# Patient Record
Sex: FEMALE | Race: WHITE | Employment: OTHER | URBAN - METROPOLITAN AREA
[De-identification: names, ages, dates, MRNs, and addresses within clinical notes are randomized per-mention and may not be internally consistent; named-entity substitution may affect disease eponyms.]

---

## 2017-11-01 ENCOUNTER — OFFICE VISIT (OUTPATIENT)
Dept: FAMILY MEDICINE CLINIC | Age: 67
End: 2017-11-01

## 2017-11-01 VITALS
DIASTOLIC BLOOD PRESSURE: 88 MMHG | BODY MASS INDEX: 26.68 KG/M2 | OXYGEN SATURATION: 95 % | TEMPERATURE: 98.9 F | HEIGHT: 62 IN | WEIGHT: 145 LBS | SYSTOLIC BLOOD PRESSURE: 140 MMHG | HEART RATE: 76 BPM

## 2017-11-01 DIAGNOSIS — M19.90 ARTHRITIS: ICD-10-CM

## 2017-11-01 DIAGNOSIS — F41.9 ANXIETY: ICD-10-CM

## 2017-11-01 DIAGNOSIS — E03.9 HYPOTHYROIDISM, UNSPECIFIED TYPE: ICD-10-CM

## 2017-11-01 DIAGNOSIS — E11.9 TYPE 2 DIABETES MELLITUS WITHOUT COMPLICATION, WITH LONG-TERM CURRENT USE OF INSULIN (HCC): Primary | ICD-10-CM

## 2017-11-01 DIAGNOSIS — I10 ESSENTIAL HYPERTENSION: ICD-10-CM

## 2017-11-01 DIAGNOSIS — Z79.4 TYPE 2 DIABETES MELLITUS WITHOUT COMPLICATION, WITH LONG-TERM CURRENT USE OF INSULIN (HCC): Primary | ICD-10-CM

## 2017-11-01 DIAGNOSIS — E03.8 OTHER SPECIFIED HYPOTHYROIDISM: ICD-10-CM

## 2017-11-01 LAB
A/G RATIO: 1.1 (ref 1.1–2.2)
ALBUMIN SERPL-MCNC: 3.1 G/DL (ref 3.4–5)
ALP BLD-CCNC: 81 U/L (ref 40–129)
ALT SERPL-CCNC: 8 U/L (ref 10–40)
ANION GAP SERPL CALCULATED.3IONS-SCNC: 13 MMOL/L (ref 3–16)
AST SERPL-CCNC: 12 U/L (ref 15–37)
BILIRUB SERPL-MCNC: <0.2 MG/DL (ref 0–1)
BUN BLDV-MCNC: 15 MG/DL (ref 7–20)
CALCIUM SERPL-MCNC: 9.3 MG/DL (ref 8.3–10.6)
CHLORIDE BLD-SCNC: 99 MMOL/L (ref 99–110)
CO2: 29 MMOL/L (ref 21–32)
CREAT SERPL-MCNC: 1.4 MG/DL (ref 0.6–1.2)
GFR AFRICAN AMERICAN: 45
GFR NON-AFRICAN AMERICAN: 38
GLOBULIN: 2.9 G/DL
GLUCOSE BLD-MCNC: 169 MG/DL (ref 70–99)
HCT VFR BLD CALC: 44.4 % (ref 36–48)
HEMOGLOBIN: 14.8 G/DL (ref 12–16)
MCH RBC QN AUTO: 29.2 PG (ref 26–34)
MCHC RBC AUTO-ENTMCNC: 33.4 G/DL (ref 31–36)
MCV RBC AUTO: 87.3 FL (ref 80–100)
PDW BLD-RTO: 14.6 % (ref 12.4–15.4)
PLATELET # BLD: 252 K/UL (ref 135–450)
PMV BLD AUTO: 8.5 FL (ref 5–10.5)
POTASSIUM SERPL-SCNC: 4.6 MMOL/L (ref 3.5–5.1)
RBC # BLD: 5.08 M/UL (ref 4–5.2)
SODIUM BLD-SCNC: 141 MMOL/L (ref 136–145)
TOTAL PROTEIN: 6 G/DL (ref 6.4–8.2)
WBC # BLD: 7.7 K/UL (ref 4–11)

## 2017-11-01 PROCEDURE — 1123F ACP DISCUSS/DSCN MKR DOCD: CPT | Performed by: NURSE PRACTITIONER

## 2017-11-01 PROCEDURE — 36415 COLL VENOUS BLD VENIPUNCTURE: CPT | Performed by: NURSE PRACTITIONER

## 2017-11-01 PROCEDURE — G8419 CALC BMI OUT NRM PARAM NOF/U: HCPCS | Performed by: NURSE PRACTITIONER

## 2017-11-01 PROCEDURE — G8484 FLU IMMUNIZE NO ADMIN: HCPCS | Performed by: NURSE PRACTITIONER

## 2017-11-01 PROCEDURE — 4004F PT TOBACCO SCREEN RCVD TLK: CPT | Performed by: NURSE PRACTITIONER

## 2017-11-01 PROCEDURE — G8400 PT W/DXA NO RESULTS DOC: HCPCS | Performed by: NURSE PRACTITIONER

## 2017-11-01 PROCEDURE — 1090F PRES/ABSN URINE INCON ASSESS: CPT | Performed by: NURSE PRACTITIONER

## 2017-11-01 PROCEDURE — 3045F PR MOST RECENT HEMOGLOBIN A1C LEVEL 7.0-9.0%: CPT | Performed by: NURSE PRACTITIONER

## 2017-11-01 PROCEDURE — 3017F COLORECTAL CA SCREEN DOC REV: CPT | Performed by: NURSE PRACTITIONER

## 2017-11-01 PROCEDURE — G8598 ASA/ANTIPLAT THER USED: HCPCS | Performed by: NURSE PRACTITIONER

## 2017-11-01 PROCEDURE — 99214 OFFICE O/P EST MOD 30 MIN: CPT | Performed by: NURSE PRACTITIONER

## 2017-11-01 PROCEDURE — 3014F SCREEN MAMMO DOC REV: CPT | Performed by: NURSE PRACTITIONER

## 2017-11-01 PROCEDURE — G8427 DOCREV CUR MEDS BY ELIG CLIN: HCPCS | Performed by: NURSE PRACTITIONER

## 2017-11-01 PROCEDURE — 4040F PNEUMOC VAC/ADMIN/RCVD: CPT | Performed by: NURSE PRACTITIONER

## 2017-11-01 RX ORDER — PAROXETINE HYDROCHLORIDE 20 MG/1
TABLET, FILM COATED ORAL
Qty: 90 TABLET | Refills: 2 | Status: SHIPPED | OUTPATIENT
Start: 2017-11-01 | End: 2018-08-09 | Stop reason: SDUPTHER

## 2017-11-01 RX ORDER — CLOPIDOGREL BISULFATE 75 MG/1
75 TABLET ORAL DAILY
COMMUNITY
End: 2017-11-22 | Stop reason: SDUPTHER

## 2017-11-01 RX ORDER — HYDROCHLOROTHIAZIDE 25 MG/1
25 TABLET ORAL DAILY
COMMUNITY
End: 2018-04-04 | Stop reason: SDUPTHER

## 2017-11-01 RX ORDER — PIOGLITAZONEHYDROCHLORIDE 30 MG/1
30 TABLET ORAL DAILY
Status: ON HOLD | COMMUNITY
End: 2018-04-24 | Stop reason: CLARIF

## 2017-11-01 ASSESSMENT — ENCOUNTER SYMPTOMS
VOMITING: 0
CONSTIPATION: 0
DIARRHEA: 0
SHORTNESS OF BREATH: 0
NAUSEA: 0
BLOOD IN STOOL: 0

## 2017-11-01 NOTE — PROGRESS NOTES
Subjective:      Patient ID: Abiodun Yun is a 78 y.o. female. HPI Pt is here to establish care. Pt is having problems with sleeping she has had problems for years, pt drinks coffee all day. Pt said she has really bad arthritis which causes her to not be able to walk because she is can not go up steps. Pt has diabetes, she has been having blood sugars running 250-400. Review of Systems   Constitutional: Positive for fatigue. Respiratory: Negative for shortness of breath. Gastrointestinal: Negative for blood in stool, constipation, diarrhea, nausea and vomiting. Psychiatric/Behavioral: Positive for sleep disturbance. Negative for hallucinations. The patient is not nervous/anxious. All other systems reviewed and are negative. Objective:   Physical Exam   Constitutional: She is oriented to person, place, and time. She appears well-developed and well-nourished. Cardiovascular: Normal rate, regular rhythm and normal heart sounds. No murmur heard. Pulmonary/Chest: Effort normal and breath sounds normal. She has no wheezes. She has no rales. Neurological: She is alert and oriented to person, place, and time. Skin: Skin is warm and dry. Psychiatric: She has a normal mood and affect. Her behavior is normal. Judgment and thought content normal.   Vitals reviewed. Assessment:      1. Type 2 diabetes mellitus without complication, with long-term current use of insulin ([facility])  COMPREHENSIVE METABOLIC PANEL    HEMOGLOBIN A1C   2. Essential hypertension  CBC   3. Arthritis     4. Hypothyroidism, unspecified type  TSH with Reflex   5. Anxiety  PARoxetine (PAXIL) 20 MG tablet           Plan:      Bishop Hansen was seen today for establish care. Diagnoses and all orders for this visit:    Type 2 diabetes mellitus without complication, with long-term current use of insulin ([facility] [address] - pt Sugars seem to run high and low. Patient cannot afford her Lantus or Humalog.  Will check with care coordinator

## 2017-11-02 LAB
ESTIMATED AVERAGE GLUCOSE: 177.2 MG/DL
HBA1C MFR BLD: 7.8 %
T4 FREE: 1.2 NG/DL (ref 0.9–1.8)
TSH REFLEX: 18.36 UIU/ML (ref 0.27–4.2)

## 2017-11-03 DIAGNOSIS — E03.8 OTHER SPECIFIED HYPOTHYROIDISM: ICD-10-CM

## 2017-11-03 RX ORDER — LEVOTHYROXINE SODIUM 137 UG/1
137 TABLET ORAL DAILY
Status: CANCELLED | OUTPATIENT
Start: 2017-11-03

## 2017-11-03 RX ORDER — LEVOTHYROXINE SODIUM 137 UG/1
137 TABLET ORAL DAILY
Qty: 90 TABLET | Refills: 0 | Status: SHIPPED | OUTPATIENT
Start: 2017-11-03 | End: 2018-03-26 | Stop reason: SDUPTHER

## 2017-11-06 ENCOUNTER — CARE COORDINATION (OUTPATIENT)
Dept: CARE COORDINATION | Age: 67
End: 2017-11-06

## 2017-11-06 NOTE — CARE COORDINATION
Left message for patient to return call to this [facility] to assess needs for care coordination. Referral received from Kylie Pearce CNP, for assistance with diabetes management and assistance with insulin. Will await return call from patient.      Ayden Dougherty RN   Care Coordinator  [phone]

## 2017-11-13 ENCOUNTER — CARE COORDINATION (OUTPATIENT)
Dept: CARE COORDINATION | Age: 67
End: 2017-11-13

## 2017-11-13 NOTE — CARE COORDINATION
Left message # 2 for patient to return call to this [facility] to assess needs for care coordination. Referral received from Daisy Ramos CNP, for assistance with diabetes management and assistance with insulin. Will await return call from patient.      Bobby Xavier RN   Care Coordinator  [phone]

## 2017-11-20 ENCOUNTER — CARE COORDINATION (OUTPATIENT)
Dept: CARE COORDINATION | Age: 67
End: 2017-11-20

## 2017-11-20 NOTE — CARE COORDINATION
Left message # 3 at number listed in chart, [phone], for patient to return call to this [facility] to assess needs for care coordination. Referral received from Shannon Garcia CNP, for assistance with diabetes management and assistance with insulin, but have not been able to reach patient. Patient has appt on 11-, please give my contact information at [phone].      Future Appointments  Date Time Provider Dannie Chiang   11/22/2017 2:15 PM Rachelle Zarate CNP [facility]     Thanks,    Gini Fitzgerald, RN   Care Coordinator  [phone]

## 2017-11-21 ENCOUNTER — CARE COORDINATION (OUTPATIENT)
Dept: CARE COORDINATION | Age: 67
End: 2017-11-21

## 2017-11-21 NOTE — CARE COORDINATION
Ambulatory Care Coordination Note  11/22/2017  CM Risk Score: 2  Denny Mortality Risk Score: 3.15    ACC: Eliezer Martínez RN    Summary Note: Patient returned call, explained role of the [facility] and Care Coordination Program.  Patient has not been on insulin for at least a year due to cost, is currently on other oral medications, but would benefit from starting insulin. She states she was on Lantus in the past, but had to stop taking it due to cost.  Patient may qualify for patient assistance from the  if she meet qualifications. She is to see Ebenezer Mcghee CNP today and to bring Social Security statement or proof of monthly income AND statement from the insurance/pharmacy of what her out of pocket expenses have been for prescriptions this year. If patient does not meet these guidelines with being so close to the end of the year, the other option may be to get her samples from a drug representative. After patient brings in all required documents, will determine if she qualifies for assistance. Which insulin would you be prescribing? This is helpful to determine if she meets guidelines from the specific . Patient's daughter in law is supportive per patient. She helps check blood sugars TID and helps sometimes with meals. Patient was currently checking blood sugars sometimes before and sometimes after meals. Patient to start checking blood sugars TID before each meal.  Patient is not on any specific diet for diabetes. Educated on ADA diet recommendations of 30-45 gram consistent carbohydrate each meal.  Discussed foods that are higher in carbs and reviewed serving and portion size.       Ambulatory Care Coordination Assessment    Care Coordination Protocol  Program Enrollment:  Rising Risk  Referral from Primary Care Provider:  Yes  Week 1 - Initial Assessment     Do you have all of your prescriptions and are they filled?:  Yes  Are you able to afford your medications?:  No  How (including ability to afford all required medical care)?:  Financially secure, some resource challenges   How wells does the patient now understand their health and well-being (symptoms, signs or risk factors) and what they need to do to manage their health?:  Reasonable to good understanding and already engages in managing health or is willing to undertake better management   How well do you think your patient can engage in healthcare discussions? (Barriers include language, deafness, aphasia, alcohol or drug problems, learning difficulties, concentration):  Clear and open communication, no identified barriers   Do other services need to be involved to help this patient?:  Other care/services not required at this time   Suggested Interventions and Community Resources  Diabetes Education: In Process (Comment: With [facility])    Medication Assistance Program:  In Process   Smoking Cessation:  Declined   Zone Management Tools:  Not Started         Set up/Review an Education Plan, Set up/Review Goals              Prior to Admission medications    Medication Sig Start Date End Date Taking?  Authorizing Provider   levothyroxine (SYNTHROID) 137 MCG tablet Take 1 tablet by mouth daily 11/3/17  Yes Jyoti De Luna CNP   clopidogrel (PLAVIX) 75 MG tablet Take 75 mg by mouth daily   Yes Historical Provider, MD   hydrochlorothiazide (HYDRODIURIL) 25 MG tablet Take 25 mg by mouth daily   Yes Historical Provider, MD   pioglitazone (ACTOS) 30 MG tablet Take 30 mg by mouth daily   Yes Historical Provider, MD   PARoxetine (PAXIL) 20 MG tablet TAKE ONE TABLET BY MOUTH ONCE DAILY 11/1/17  Yes Jyoti De Luna CNP   traZODone (DESYREL) 100 MG tablet Take 150 mg by mouth nightly    Yes Historical Provider, MD   amLODIPine (NORVASC) 10 MG tablet Take 1 tablet by mouth daily 7/27/16  Yes Terrance Massey CNP   atorvastatin (LIPITOR) 10 MG tablet Take 1 tablet by mouth daily 7/27/16  Yes Terrance Massey CNP   carvedilol

## 2017-11-22 ENCOUNTER — OFFICE VISIT (OUTPATIENT)
Dept: FAMILY MEDICINE CLINIC | Age: 67
End: 2017-11-22

## 2017-11-22 VITALS
WEIGHT: 146 LBS | TEMPERATURE: 98.1 F | BODY MASS INDEX: 26.7 KG/M2 | OXYGEN SATURATION: 93 % | SYSTOLIC BLOOD PRESSURE: 130 MMHG | HEART RATE: 73 BPM | DIASTOLIC BLOOD PRESSURE: 96 MMHG

## 2017-11-22 DIAGNOSIS — E78.00 HIGH CHOLESTEROL: ICD-10-CM

## 2017-11-22 DIAGNOSIS — I21.4 NSTEMI (NON-ST ELEVATED MYOCARDIAL INFARCTION) (HCC): ICD-10-CM

## 2017-11-22 DIAGNOSIS — E11.9 TYPE 2 DIABETES MELLITUS WITHOUT COMPLICATION, WITH LONG-TERM CURRENT USE OF INSULIN (HCC): Primary | ICD-10-CM

## 2017-11-22 DIAGNOSIS — E11.42 TYPE 2 DIABETES MELLITUS WITH DIABETIC POLYNEUROPATHY, WITHOUT LONG-TERM CURRENT USE OF INSULIN (HCC): ICD-10-CM

## 2017-11-22 DIAGNOSIS — G62.9 NEUROPATHY: ICD-10-CM

## 2017-11-22 DIAGNOSIS — I10 ESSENTIAL HYPERTENSION: ICD-10-CM

## 2017-11-22 DIAGNOSIS — J40 BRONCHITIS: ICD-10-CM

## 2017-11-22 DIAGNOSIS — F41.9 ANXIETY: ICD-10-CM

## 2017-11-22 DIAGNOSIS — Z79.4 TYPE 2 DIABETES MELLITUS WITHOUT COMPLICATION, WITH LONG-TERM CURRENT USE OF INSULIN (HCC): Primary | ICD-10-CM

## 2017-11-22 DIAGNOSIS — E11.21 DM KIDNEY DISEASE (HCC): ICD-10-CM

## 2017-11-22 DIAGNOSIS — E11.42 DIABETIC POLYNEUROPATHY ASSOCIATED WITH TYPE 2 DIABETES MELLITUS (HCC): ICD-10-CM

## 2017-11-22 LAB
ANION GAP SERPL CALCULATED.3IONS-SCNC: 12 MMOL/L (ref 3–16)
BUN BLDV-MCNC: 18 MG/DL (ref 7–20)
CALCIUM SERPL-MCNC: 9.1 MG/DL (ref 8.3–10.6)
CHLORIDE BLD-SCNC: 97 MMOL/L (ref 99–110)
CO2: 32 MMOL/L (ref 21–32)
CREAT SERPL-MCNC: 1.4 MG/DL (ref 0.6–1.2)
GFR AFRICAN AMERICAN: 45
GFR NON-AFRICAN AMERICAN: 38
GLUCOSE BLD-MCNC: 142 MG/DL (ref 70–99)
POTASSIUM SERPL-SCNC: 3.9 MMOL/L (ref 3.5–5.1)
SODIUM BLD-SCNC: 141 MMOL/L (ref 136–145)

## 2017-11-22 PROCEDURE — 3014F SCREEN MAMMO DOC REV: CPT | Performed by: NURSE PRACTITIONER

## 2017-11-22 PROCEDURE — 99214 OFFICE O/P EST MOD 30 MIN: CPT | Performed by: NURSE PRACTITIONER

## 2017-11-22 PROCEDURE — 4004F PT TOBACCO SCREEN RCVD TLK: CPT | Performed by: NURSE PRACTITIONER

## 2017-11-22 PROCEDURE — 3017F COLORECTAL CA SCREEN DOC REV: CPT | Performed by: NURSE PRACTITIONER

## 2017-11-22 PROCEDURE — G8427 DOCREV CUR MEDS BY ELIG CLIN: HCPCS | Performed by: NURSE PRACTITIONER

## 2017-11-22 PROCEDURE — G8419 CALC BMI OUT NRM PARAM NOF/U: HCPCS | Performed by: NURSE PRACTITIONER

## 2017-11-22 PROCEDURE — 36415 COLL VENOUS BLD VENIPUNCTURE: CPT | Performed by: NURSE PRACTITIONER

## 2017-11-22 PROCEDURE — G8484 FLU IMMUNIZE NO ADMIN: HCPCS | Performed by: NURSE PRACTITIONER

## 2017-11-22 PROCEDURE — G8598 ASA/ANTIPLAT THER USED: HCPCS | Performed by: NURSE PRACTITIONER

## 2017-11-22 PROCEDURE — G8400 PT W/DXA NO RESULTS DOC: HCPCS | Performed by: NURSE PRACTITIONER

## 2017-11-22 PROCEDURE — 4040F PNEUMOC VAC/ADMIN/RCVD: CPT | Performed by: NURSE PRACTITIONER

## 2017-11-22 PROCEDURE — 1090F PRES/ABSN URINE INCON ASSESS: CPT | Performed by: NURSE PRACTITIONER

## 2017-11-22 PROCEDURE — 1123F ACP DISCUSS/DSCN MKR DOCD: CPT | Performed by: NURSE PRACTITIONER

## 2017-11-22 PROCEDURE — 3045F PR MOST RECENT HEMOGLOBIN A1C LEVEL 7.0-9.0%: CPT | Performed by: NURSE PRACTITIONER

## 2017-11-22 RX ORDER — CLOPIDOGREL BISULFATE 75 MG/1
75 TABLET ORAL DAILY
Qty: 90 TABLET | Refills: 3 | Status: SHIPPED | OUTPATIENT
Start: 2017-11-22 | End: 2018-08-09 | Stop reason: SDUPTHER

## 2017-11-22 RX ORDER — DOXYCYCLINE HYCLATE 100 MG/1
100 CAPSULE ORAL 2 TIMES DAILY
Qty: 14 CAPSULE | Refills: 0 | Status: SHIPPED | OUTPATIENT
Start: 2017-11-22 | End: 2017-11-29

## 2017-11-22 RX ORDER — ATORVASTATIN CALCIUM 10 MG/1
10 TABLET, FILM COATED ORAL DAILY
Qty: 90 TABLET | Refills: 0 | Status: SHIPPED | OUTPATIENT
Start: 2017-11-22 | End: 2018-04-04 | Stop reason: SDUPTHER

## 2017-11-22 RX ORDER — LIDOCAINE 50 MG/G
1 PATCH TOPICAL DAILY
Qty: 30 PATCH | Refills: 0 | Status: SHIPPED | OUTPATIENT
Start: 2017-11-22 | End: 2018-10-03 | Stop reason: ALTCHOICE

## 2017-11-22 RX ORDER — GLIMEPIRIDE 4 MG/1
TABLET ORAL
Qty: 180 TABLET | Refills: 2 | Status: ON HOLD | OUTPATIENT
Start: 2017-11-22 | End: 2018-05-04 | Stop reason: HOSPADM

## 2017-11-22 RX ORDER — GABAPENTIN 300 MG/1
CAPSULE ORAL
Qty: 360 CAPSULE | Refills: 1 | Status: ON HOLD | OUTPATIENT
Start: 2017-11-22 | End: 2018-05-04

## 2017-11-22 RX ORDER — AMLODIPINE BESYLATE 10 MG/1
10 TABLET ORAL DAILY
Qty: 90 TABLET | Refills: 0 | Status: SHIPPED | OUTPATIENT
Start: 2017-11-22 | End: 2018-04-04 | Stop reason: SDUPTHER

## 2017-11-22 RX ORDER — TRAZODONE HYDROCHLORIDE 150 MG/1
150 TABLET ORAL NIGHTLY
Qty: 90 TABLET | Refills: 2 | Status: SHIPPED | OUTPATIENT
Start: 2017-11-22 | End: 2018-08-09 | Stop reason: SDUPTHER

## 2017-11-22 RX ORDER — CARVEDILOL 6.25 MG/1
TABLET ORAL
Qty: 180 TABLET | Refills: 2 | Status: ON HOLD | OUTPATIENT
Start: 2017-11-22 | End: 2018-05-04

## 2017-11-22 RX ORDER — LISINOPRIL 40 MG/1
40 TABLET ORAL DAILY
Qty: 90 TABLET | Refills: 1 | Status: ON HOLD | OUTPATIENT
Start: 2017-11-22 | End: 2018-05-04 | Stop reason: HOSPADM

## 2017-11-22 ASSESSMENT — ENCOUNTER SYMPTOMS
COUGH: 1
SHORTNESS OF BREATH: 1
WHEEZING: 1
CHEST TIGHTNESS: 1

## 2017-11-22 NOTE — PROGRESS NOTES
tablet    lisinopril (PRINIVIL;ZESTRIL) 40 MG tablet   4. High cholesterol  atorvastatin (LIPITOR) 10 MG tablet   5. Neuropathy ([facility])  lidocaine (LIDODERM) 5 %    gabapentin (NEURONTIN) 300 MG capsule   6. Type 2 diabetes mellitus with diabetic polyneuropathy, without long-term current use of insulin ([facility])  lidocaine (LIDODERM) 5 %    glimepiride (AMARYL) 4 MG tablet    gabapentin (NEURONTIN) 300 MG capsule    lisinopril (PRINIVIL;ZESTRIL) 40 MG tablet    metFORMIN (GLUCOPHAGE) 1000 MG tablet   7. Anxiety  traZODone (DESYREL) 150 MG tablet   8. NSTEMI (non-ST elevated myocardial infarction) ([facility])  clopidogrel (PLAVIX) 75 MG tablet           Plan:      Abdulaziz Bruno was seen today for 2 week follow-up, cough and chest congestion. Diagnoses and all orders for this visit:    Type 2 diabetes mellitus without complication, with long-term current use of insulin ([facility] [address] - Pt a1c is well managed currently her kidney function was decreased last visit will check again today. -     BASIC METABOLIC PANEL    Bronchitis - pt is coughing up mucus will treat with doxy. She educated to get Mucinex along with Flonase. Essential hypertension - patient's blood pressure still slightly elevated Will refill medications patient will come back in one month for blood pressure check. -     amLODIPine (NORVASC) 10 MG tablet; Take 1 tablet by mouth daily  -     carvedilol (COREG) 6.25 MG tablet; TAKE ONE TABLET BY MOUTH TWICE DAILY  -     lisinopril (PRINIVIL;ZESTRIL) 40 MG tablet; Take 1 tablet by mouth daily    High cholesterol - patient is due for cholesterol refill  -     atorvastatin (LIPITOR) 10 MG tablet; Take 1 tablet by mouth daily    Neuropathy ([address] - we'll try to give patient Lidoderm patches to help with pain. Will increase patient's Neurontin one tablet in the afternoon. If patient's kidney function remains low we will send in nephrology.   -     lidocaine (LIDODERM) 5 %; Place 1 patch onto the skin daily 12 hours on, 12 hours off.  -     gabapentin (NEURONTIN) 300 MG capsule; 1 tablet morning and night two tablets afternoon    Type 2 diabetes mellitus with diabetic polyneuropathy, without long-term current use of insulin ([facility]) - depending on patient's GFR might need to stop metformin. Will try to start patient on Lantus. -     lidocaine (LIDODERM) 5 %; Place 1 patch onto the skin daily 12 hours on, 12 hours off.  -     glimepiride (AMARYL) 4 MG tablet; TAKE ONE TABLET BY MOUTH TWICE DAILY  -     gabapentin (NEURONTIN) 300 MG capsule; 1 tablet morning and night two tablets afternoon  -     lisinopril (PRINIVIL;ZESTRIL) 40 MG tablet; Take 1 tablet by mouth daily  -     metFORMIN (GLUCOPHAGE) 1000 MG tablet; Take 1 tablet by mouth 2 times daily (with meals)    Anxiety - will increase patient's trazodone by 50 mg nightly. -     traZODone (DESYREL) 150 MG tablet; Take 1 tablet by mouth nightly    NSTEMI (non-ST elevated myocardial infarction) [facility]) - patient is due for Plavix. -     clopidogrel (PLAVIX) 75 MG tablet;  Take 1 tablet by mouth daily

## 2017-11-27 ENCOUNTER — CARE COORDINATION (OUTPATIENT)
Dept: CARE COORDINATION | Age: 67
End: 2017-11-27

## 2017-11-27 NOTE — CARE COORDINATION
Patient may qualify for patient assistance from the  if she meet qualifications. She saw Lindy León CNP, last week and was supposed to take Social Security statement or proof of monthly income AND statement from the insurance/pharmacy of what her out of pocket expenses have been for prescriptions this year. Left message for patient to return call to this [address] to determine if she dropped off the required documents to apply for patient assistance programs. If patient does not meet patient assistance guidelines with being so close to the end of the year, the other option may be to get her samples from a drug representative. After patient brings in all required documents, will determine if she qualifies for assistance. Rema Leal CNP, would like to start patient on long-acting insulin such as Levemir, Lantus, or Toujeo. Will await return call from patient.      Abrahan Pinzon RN   Care Coordinator  [phone]

## 2017-11-28 DIAGNOSIS — N28.9 ABNORMAL KIDNEY FUNCTION: Primary | ICD-10-CM

## 2017-12-04 ENCOUNTER — TELEPHONE (OUTPATIENT)
Dept: FAMILY MEDICINE CLINIC | Age: 67
End: 2017-12-04

## 2017-12-04 DIAGNOSIS — R19.7 DIARRHEA, UNSPECIFIED TYPE: Primary | ICD-10-CM

## 2017-12-05 ENCOUNTER — CARE COORDINATION (OUTPATIENT)
Dept: CARE COORDINATION | Age: 67
End: 2017-12-05

## 2017-12-12 ENCOUNTER — CARE COORDINATION (OUTPATIENT)
Dept: CARE COORDINATION | Age: 67
End: 2017-12-12

## 2017-12-12 NOTE — CARE COORDINATION
Diabetes Assessment    Medic Alert ID:  No  Meal Planning:  None   How often do you test your blood sugar?:  Meals   Do you have barriers with adherence to non-pharmacologic self-management interventions? (Nutrition/Exercise/Self-Monitoring):  Yes   Have you ever had to go to the ED for symptoms of low blood sugar?:  No       No patient-reported symptoms        Spoke with patient and she states she has requested a statement from Social Security and her insurance company to file for patient assistance medication programs for insulin. Patient has not yet received these in the mail, but states she will contact this [facility] as soon as they arrive. Patient reports she is checking her blood sugars TID before meals, but is not recording them on anything. She will start writing down blood sugars on a notebook to review with this [facility] at next follow up. She states her blood sugars fluctuate but are running around 200 most of the time. Will await financial statements to assist with patient assistance programs.     Sagrario Porter, RN   Care Coordinator  [phone]

## 2018-01-09 ENCOUNTER — HOSPITAL ENCOUNTER (OUTPATIENT)
Dept: SURGERY | Age: 68
Discharge: HOME | End: 2018-01-09
Attending: INTERNAL MEDICINE | Admitting: INTERNAL MEDICINE

## 2018-01-09 VITALS
HEART RATE: 85 BPM | TEMPERATURE: 98.5 F | DIASTOLIC BLOOD PRESSURE: 74 MMHG | HEIGHT: 60 IN | RESPIRATION RATE: 16 BRPM | BODY MASS INDEX: 27.48 KG/M2 | WEIGHT: 140 LBS | SYSTOLIC BLOOD PRESSURE: 172 MMHG | OXYGEN SATURATION: 92 %

## 2018-01-09 DIAGNOSIS — K52.9 NONINFECTIVE GASTROENTERITIS AND COLITIS: ICD-10-CM

## 2018-01-09 LAB
GLUCOSE BLD-MCNC: 110 MG/DL (ref 70–99)
PERFORMED ON: ABNORMAL

## 2018-01-09 RX ORDER — METOCLOPRAMIDE HYDROCHLORIDE 5 MG/ML
10 INJECTION INTRAMUSCULAR; INTRAVENOUS
Status: ACTIVE | OUTPATIENT
Start: 2018-01-09 | End: 2018-01-09

## 2018-01-09 RX ORDER — OXYCODONE HYDROCHLORIDE 5 MG/1
5 TABLET ORAL PRN
Status: ACTIVE | OUTPATIENT
Start: 2018-01-09 | End: 2018-01-09

## 2018-01-09 RX ORDER — MORPHINE SULFATE 2 MG/ML
1 INJECTION, SOLUTION INTRAMUSCULAR; INTRAVENOUS EVERY 5 MIN PRN
Status: DISCONTINUED | OUTPATIENT
Start: 2018-01-09 | End: 2018-01-10 | Stop reason: HOSPADM

## 2018-01-09 RX ORDER — MEPERIDINE HYDROCHLORIDE 50 MG/ML
12.5 INJECTION INTRAMUSCULAR; INTRAVENOUS; SUBCUTANEOUS EVERY 5 MIN PRN
Status: DISCONTINUED | OUTPATIENT
Start: 2018-01-09 | End: 2018-01-10 | Stop reason: HOSPADM

## 2018-01-09 RX ORDER — LABETALOL HYDROCHLORIDE 5 MG/ML
5 INJECTION, SOLUTION INTRAVENOUS EVERY 10 MIN PRN
Status: DISCONTINUED | OUTPATIENT
Start: 2018-01-09 | End: 2018-01-10 | Stop reason: HOSPADM

## 2018-01-09 RX ORDER — HYDRALAZINE HYDROCHLORIDE 20 MG/ML
5 INJECTION INTRAMUSCULAR; INTRAVENOUS EVERY 10 MIN PRN
Status: DISCONTINUED | OUTPATIENT
Start: 2018-01-09 | End: 2018-01-10 | Stop reason: HOSPADM

## 2018-01-09 RX ORDER — OXYCODONE HYDROCHLORIDE 5 MG/1
10 TABLET ORAL PRN
Status: ACTIVE | OUTPATIENT
Start: 2018-01-09 | End: 2018-01-09

## 2018-01-09 RX ORDER — LIDOCAINE HYDROCHLORIDE 10 MG/ML
0.1 INJECTION, SOLUTION EPIDURAL; INFILTRATION; INTRACAUDAL; PERINEURAL
Status: ACTIVE | OUTPATIENT
Start: 2018-01-09 | End: 2018-01-09

## 2018-01-09 RX ORDER — PROMETHAZINE HYDROCHLORIDE 25 MG/ML
6.25 INJECTION, SOLUTION INTRAMUSCULAR; INTRAVENOUS
Status: ACTIVE | OUTPATIENT
Start: 2018-01-09 | End: 2018-01-09

## 2018-01-09 RX ORDER — SODIUM CHLORIDE, SODIUM LACTATE, POTASSIUM CHLORIDE, CALCIUM CHLORIDE 600; 310; 30; 20 MG/100ML; MG/100ML; MG/100ML; MG/100ML
INJECTION, SOLUTION INTRAVENOUS ONCE
Status: COMPLETED | OUTPATIENT
Start: 2018-01-09 | End: 2018-01-09

## 2018-01-09 RX ORDER — DIPHENHYDRAMINE HYDROCHLORIDE 50 MG/ML
12.5 INJECTION INTRAMUSCULAR; INTRAVENOUS
Status: ACTIVE | OUTPATIENT
Start: 2018-01-09 | End: 2018-01-09

## 2018-01-09 RX ADMIN — SODIUM CHLORIDE, SODIUM LACTATE, POTASSIUM CHLORIDE, CALCIUM CHLORIDE: 600; 310; 30; 20 INJECTION, SOLUTION INTRAVENOUS at 09:21

## 2018-01-09 ASSESSMENT — PAIN SCALES - GENERAL
PAINLEVEL_OUTOF10: 0
PAINLEVEL_OUTOF10: 0

## 2018-01-09 ASSESSMENT — PAIN - FUNCTIONAL ASSESSMENT: PAIN_FUNCTIONAL_ASSESSMENT: 0-10

## 2018-01-09 NOTE — ANESTHESIA PRE-OP
Department of Anesthesiology  Preprocedure Note       Name:  Zayda Lopez   Age:  78 y.o.  :  1950                                          MRN:  [id number]         Date:  2018      Surgeon: Antolin Quevedo    Procedure: Colonoscopy    Medications prior to admission:   Prior to Admission medications    Medication Sig Start Date End Date Taking? Authorizing Provider   traZODone (DESYREL) 150 MG tablet Take 1 tablet by mouth nightly 17  Yes Geoff Sánchez CNP   amLODIPine (NORVASC) 10 MG tablet Take 1 tablet by mouth daily 17  Yes Geoff Sánchez CNP   atorvastatin (LIPITOR) 10 MG tablet Take 1 tablet by mouth daily 17  Yes Geoff Sánchez CNP   carvedilol (COREG) 6.25 MG tablet TAKE ONE TABLET BY MOUTH TWICE DAILY 17  Yes Geoff Sánchez CNP   glimepiride (AMARYL) 4 MG tablet TAKE ONE TABLET BY MOUTH TWICE DAILY 17  Yes Geoff Sánchez CNP   clopidogrel (PLAVIX) 75 MG tablet Take 1 tablet by mouth daily 17  Yes Geoff Sánchez CNP   gabapentin (NEURONTIN) 300 MG capsule 1 tablet morning and night two tablets afternoon  Patient taking differently: 1 tablet morning and night and two tablets afternoon.  17  Yes Geoff Sánchez CNP   lisinopril (PRINIVIL;ZESTRIL) 40 MG tablet Take 1 tablet by mouth daily 17  Yes Geoff Sánchez CNP   metFORMIN (GLUCOPHAGE) 1000 MG tablet Take 1 tablet by mouth 2 times daily (with meals) 17  Yes Geoff Sánchez CNP   levothyroxine (SYNTHROID) 137 MCG tablet Take 1 tablet by mouth daily 11/3/17  Yes Geoff Sánchez CNP   hydrochlorothiazide (HYDRODIURIL) 25 MG tablet Take 25 mg by mouth daily   Yes Historical Provider, MD   pioglitazone (ACTOS) 30 MG tablet Take 30 mg by mouth daily   Yes Historical Provider, MD   PARoxetine (PAXIL) 20 MG tablet TAKE ONE TABLET BY MOUTH ONCE DAILY 17  Yes Geoff Sánchez CNP   Multiple Minerals-Vitamins (CALCIUM & VIT D3 BONE Surgical History:        Procedure Laterality Date    BACK SURGERY      EYE SURGERY Right 2/22/2013    cataract removal       Social History:    Social History   Substance Use Topics    Smoking status: Current Every Day Smoker     Packs/day: 2.00     Types: Cigarettes    Smokeless tobacco: Never Used    Alcohol use No                                Ready to quit: Not Answered  Counseling given: Not Answered      Vital Signs (Current):   Vitals:    01/09/18 0919   BP: (!) 131/93   Pulse: 80   Resp: 18   Temp: 98.5 °F (36.9 °C)   TempSrc: Temporal   SpO2: 93%   Weight: 140 lb (63.5 kg)   Height: 5' (1.524 m)                                              BP Readings from Last 3 Encounters:   01/09/18 (!) 131/93   11/22/17 (!) 130/96   11/01/17 (!) 140/88       NPO Status: Time of last liquid consumption: 0600                        Time of last solid consumption: 1800 (chicken noodle soup )                        Date of last liquid consumption: 01/09/18                        Date of last solid food consumption: 01/08/18    BMI:   Wt Readings from Last 3 Encounters:   01/09/18 140 lb (63.5 kg)   01/02/18 140 lb (63.5 kg)   11/22/17 146 lb (66.2 kg)     Body mass index is 27.34 kg/m². Anesthesia Evaluation  Patient summary reviewed and Nursing notes reviewed no history of anesthetic complications:   Airway: Mallampati: II  TM distance: >3 FB   Neck ROM: full  Mouth opening: > = 3 FB Dental:          Pulmonary:                              Cardiovascular:    (+) hypertension:, past MI:,                   Neuro/Psych:   (+) CVA:, neuromuscular disease:, TIA,             GI/Hepatic/Renal:             Endo/Other:    (+) hypothyroidism::., .                 Abdominal:           Vascular:                                        Anesthesia Plan      general     ASA 1    (41-year-old female presents for colonoscopy. Plan general anesthesia with ASA standard monitors. Questions answered.   Patient agreeable with

## 2018-01-09 NOTE — ANESTHESIA POST-OP
Postoperative Anesthesia Note    Name:    Danilo Sharpe  MRN:      [id number]    Patient Vitals for the past 12 hrs:   BP Temp Temp src Pulse Resp SpO2 Height Weight   01/09/18 1018 (!) 172/74 - - 85 16 92 % - -   01/09/18 1008 (!) 158/70 - - 85 14 92 % - -   01/09/18 0956 (!) 160/77 - - 79 20 97 % - -   01/09/18 0919 (!) 131/93 98.5 °F (36.9 °C) Temporal 80 18 93 % 5' (1.524 m) 140 lb (63.5 kg)        LABS:    CBC  Lab Results   Component Value Date/Time    WBC 7.7 11/01/2017 02:23 PM    HGB 14.8 11/01/2017 02:23 PM    HCT 44.4 11/01/2017 02:23 PM     11/01/2017 02:23 PM     RENAL  Lab Results   Component Value Date/Time     11/22/2017 02:40 PM    K 3.9 11/22/2017 02:40 PM    CL 97 (L) 11/22/2017 02:40 PM    CO2 32 11/22/2017 02:40 PM    BUN 18 11/22/2017 02:40 PM    CREATININE 1.4 (H) 11/22/2017 02:40 PM    GLUCOSE 142 (H) 11/22/2017 02:40 PM     COAGS  Lab Results   Component Value Date/Time    PROTIME 9.9 (L) 10/12/2013 12:00 AM    INR 0.88 10/12/2013 12:00 AM    APTT 81.4 (H) 10/14/2013 06:15 AM       Intake & Output: In: 670 [P.O.:120; I.V.:550]  Out: -     Nausea & Vomiting:  No    Level of Consciousness:  Awake    Pain Assessment:  Adequate analgesia    Anesthesia Complications:  No apparent anesthetic complications    SUMMARY      Vital signs stable  OK to discharge from Stage I post anesthesia care.   Care transferred from Anesthesiology department on discharge from perioperative area

## 2018-01-15 ENCOUNTER — CARE COORDINATION (OUTPATIENT)
Dept: CARE COORDINATION | Age: 68
End: 2018-01-15

## 2018-01-15 NOTE — CARE COORDINATION
Left message for patient to return call to this [address] to follow up on chronic health conditions and to assess ongoing needs for care coordination. Need to discuss starting insulin with patient and cost for prescriptions. Will await return call.        Preston Hernandez RN   Care Coordinator  [phone]

## 2018-01-24 RX ORDER — SODIUM CHLORIDE, SODIUM LACTATE, POTASSIUM CHLORIDE, CALCIUM CHLORIDE 600; 310; 30; 20 MG/100ML; MG/100ML; MG/100ML; MG/100ML
INJECTION, SOLUTION INTRAVENOUS CONTINUOUS
Status: CANCELLED | OUTPATIENT
Start: 2018-01-24

## 2018-01-24 RX ORDER — LIDOCAINE HYDROCHLORIDE 10 MG/ML
1 INJECTION, SOLUTION EPIDURAL; INFILTRATION; INTRACAUDAL; PERINEURAL
Status: CANCELLED | OUTPATIENT
Start: 2018-01-24 | End: 2018-01-24

## 2018-01-24 RX ORDER — SODIUM CHLORIDE 0.9 % (FLUSH) 0.9 %
10 SYRINGE (ML) INJECTION PRN
Status: CANCELLED | OUTPATIENT
Start: 2018-01-24

## 2018-01-24 RX ORDER — SODIUM CHLORIDE 0.9 % (FLUSH) 0.9 %
10 SYRINGE (ML) INJECTION EVERY 12 HOURS SCHEDULED
Status: CANCELLED | OUTPATIENT
Start: 2018-01-24

## 2018-01-25 ENCOUNTER — HOSPITAL ENCOUNTER (OUTPATIENT)
Dept: SURGERY | Age: 68
Discharge: HOME | End: 2018-01-25
Attending: INTERNAL MEDICINE | Admitting: INTERNAL MEDICINE

## 2018-01-25 VITALS
SYSTOLIC BLOOD PRESSURE: 174 MMHG | DIASTOLIC BLOOD PRESSURE: 77 MMHG | HEIGHT: 62 IN | WEIGHT: 145 LBS | BODY MASS INDEX: 26.68 KG/M2 | HEART RATE: 82 BPM | OXYGEN SATURATION: 94 % | RESPIRATION RATE: 16 BRPM | TEMPERATURE: 97.7 F

## 2018-01-25 DIAGNOSIS — K52.9 NONINFECTIVE GASTROENTERITIS AND COLITIS: ICD-10-CM

## 2018-01-25 LAB
GLUCOSE BLD-MCNC: 89 MG/DL (ref 70–99)
PERFORMED ON: NORMAL

## 2018-01-25 RX ORDER — LABETALOL HYDROCHLORIDE 5 MG/ML
5 INJECTION, SOLUTION INTRAVENOUS EVERY 10 MIN PRN
Status: DISCONTINUED | OUTPATIENT
Start: 2018-01-25 | End: 2018-01-26 | Stop reason: HOSPADM

## 2018-01-25 RX ORDER — MEPERIDINE HYDROCHLORIDE 50 MG/ML
12.5 INJECTION INTRAMUSCULAR; INTRAVENOUS; SUBCUTANEOUS EVERY 5 MIN PRN
Status: DISCONTINUED | OUTPATIENT
Start: 2018-01-25 | End: 2018-01-26 | Stop reason: HOSPADM

## 2018-01-25 RX ORDER — OXYCODONE HYDROCHLORIDE 5 MG/1
10 TABLET ORAL PRN
Status: ACTIVE | OUTPATIENT
Start: 2018-01-25 | End: 2018-01-25

## 2018-01-25 RX ORDER — MORPHINE SULFATE 2 MG/ML
1 INJECTION, SOLUTION INTRAMUSCULAR; INTRAVENOUS EVERY 5 MIN PRN
Status: DISCONTINUED | OUTPATIENT
Start: 2018-01-25 | End: 2018-01-26 | Stop reason: HOSPADM

## 2018-01-25 RX ORDER — DIPHENHYDRAMINE HYDROCHLORIDE 50 MG/ML
12.5 INJECTION INTRAMUSCULAR; INTRAVENOUS
Status: ACTIVE | OUTPATIENT
Start: 2018-01-25 | End: 2018-01-25

## 2018-01-25 RX ORDER — OXYCODONE HYDROCHLORIDE 5 MG/1
5 TABLET ORAL PRN
Status: ACTIVE | OUTPATIENT
Start: 2018-01-25 | End: 2018-01-25

## 2018-01-25 RX ORDER — METOCLOPRAMIDE HYDROCHLORIDE 5 MG/ML
10 INJECTION INTRAMUSCULAR; INTRAVENOUS
Status: ACTIVE | OUTPATIENT
Start: 2018-01-25 | End: 2018-01-25

## 2018-01-25 RX ORDER — LIDOCAINE HYDROCHLORIDE 10 MG/ML
0.1 INJECTION, SOLUTION EPIDURAL; INFILTRATION; INTRACAUDAL; PERINEURAL
Status: ACTIVE | OUTPATIENT
Start: 2018-01-25 | End: 2018-01-25

## 2018-01-25 RX ORDER — PROMETHAZINE HYDROCHLORIDE 25 MG/ML
6.25 INJECTION, SOLUTION INTRAMUSCULAR; INTRAVENOUS
Status: ACTIVE | OUTPATIENT
Start: 2018-01-25 | End: 2018-01-25

## 2018-01-25 RX ORDER — SODIUM CHLORIDE, SODIUM LACTATE, POTASSIUM CHLORIDE, CALCIUM CHLORIDE 600; 310; 30; 20 MG/100ML; MG/100ML; MG/100ML; MG/100ML
INJECTION, SOLUTION INTRAVENOUS ONCE
Status: DISCONTINUED | OUTPATIENT
Start: 2018-01-25 | End: 2018-01-26 | Stop reason: HOSPADM

## 2018-01-25 RX ORDER — HYDRALAZINE HYDROCHLORIDE 20 MG/ML
5 INJECTION INTRAMUSCULAR; INTRAVENOUS EVERY 10 MIN PRN
Status: DISCONTINUED | OUTPATIENT
Start: 2018-01-25 | End: 2018-01-26 | Stop reason: HOSPADM

## 2018-01-25 ASSESSMENT — PAIN SCALES - GENERAL
PAINLEVEL_OUTOF10: 0
PAINLEVEL_OUTOF10: 0

## 2018-01-25 ASSESSMENT — PAIN - FUNCTIONAL ASSESSMENT: PAIN_FUNCTIONAL_ASSESSMENT: 0-10

## 2018-01-25 NOTE — H&P
Pre-sedation Assessment    History and Physical / Pre-Sedation Assessment  Patient:  Navdeep Moreau   :   1950     Intended Procedure: colonoscopy      HPI: Diarrhea/incontinence w neg. Colon Bx and h/o polyps not removed due to Plavix    Nurses notes reviewed and agreed. Medications reviewed  Allergies: Allergies   Allergen Reactions    Celecoxib Swelling    Codeine Nausea Only    Zoloft [Sertraline Hcl] Diarrhea           Physical Exam:  Vital Signs: /83   Pulse 84   Temp 97.7 °F (36.5 °C) (Temporal)   Resp 18   Ht 5' 2\" (1.575 m)   Wt 145 lb (65.8 kg)   LMP  (LMP Unknown)   SpO2 92%   BMI 26.52 kg/m²  Body mass index is 26.52 kg/m². Airway:Normal  Cardiac:Normal  Pulmonary:Normal  Abdomen:Normal  Specific to procedure: none      Pre-Procedure Assessment/Plan:  ASA 3 - Patient with moderate systemic disease with functional limitations    Level of Sedation Plan:Deep sedation    Post Procedure plan: Return to same level of care    I assessed the patient and find that the patient is in satisfactory condition to proceed with the planned procedure and sedation plan. I have explained the risk, benefits, and alternatives to the procedure. The patient understands and agrees to proceed.   Yes    Terryann Fothergill, MD       [phone]) [phone]        Terryann Fothergill  8:00 AM 2018

## 2018-01-25 NOTE — ANESTHESIA PRE-OP
tablet by mouth 2 times daily (with meals) 180 tablet 1    levothyroxine (SYNTHROID) 137 MCG tablet Take 1 tablet by mouth daily 90 tablet 0    hydrochlorothiazide (HYDRODIURIL) 25 MG tablet Take 25 mg by mouth daily      pioglitazone (ACTOS) 30 MG tablet Take 30 mg by mouth daily      PARoxetine (PAXIL) 20 MG tablet TAKE ONE TABLET BY MOUTH ONCE DAILY 90 tablet 2    Insulin Pen Needle (PEN NEEDLES 31GX5/16\") 31G X 8 MM MISC DX E11.9, use with insulin 4 times daily 150 each 12    clonazePAM (KLONOPIN) 0.5 MG disintegrating tablet DISSOLVE ONE TABLET IN MOUTH ONCE NIGHTLY AS NEEDED 30 tablet 0    Insulin Syringe-Needle U-100 (INSULIN SYRINGE .5CC/31GX5/16\") 31G X 5/16\" 0.5 ML MISC 1 each by Does not apply route 4 times daily as needed 100 each 3    glucose blood VI test strips (ONE TOUCH ULTRA TEST) strip DX E65.11. FSBS 4 times daily. 150 each 3    Multiple Minerals-Vitamins (CALCIUM & VIT D3 BONE HEALTH PO) Take  by mouth daily.  Blood Glucose Monitoring Suppl (BLOOD GLUCOSE METER) KIT Check glucose 4 times daily for diabetes. 250.02. ONE TOUCH ULTRA 2 1 kit 0    aspirin 81 MG EC tablet Take 1 tablet by mouth daily.  30 tablet 1    amLODIPine (NORVASC) 10 MG tablet Take 1 tablet by mouth daily 90 tablet 0     Current Facility-Administered Medications   Medication Dose Route Frequency Provider Last Rate Last Dose    lactated ringers infusion   Intravenous Once Lobito Kelley MD        lidocaine PF 1 % injection 0.1 mL  0.1 mL Intradermal Once PRN Lobito Kelley MD        HYDROmorphone (DILAUDID) injection 0.25 mg  0.25 mg Intravenous Q5 Min PRN Buffy Gonzalez MD        HYDROmorphone (DILAUDID) injection 0.5 mg  0.5 mg Intravenous Q5 Min PRN Buffy Gonzalez MD        morphine (PF) injection 1 mg  1 mg Intravenous Q5 Min PRN Buffy Gonzalez MD        HYDROmorphone (DILAUDID) injection 0.5 mg  0.5 mg Intravenous Q5 Min PRN Buffy Gonzalez MD        oxyCODONE (ROXICODONE) immediate release tablet 5 mg  5 mg Oral PRN Vernaoumar Mccarthy MD        Or    oxyCODONE (ROXICODONE) immediate release tablet 10 mg  10 mg Oral PRN Martha Mccarthy MD        diphenhydrAMINE (BENADRYL) injection 12.5 mg  12.5 mg Intravenous Once PRN Martha Mccarthy MD        metoclopramide (REGLAN) injection 10 mg  10 mg Intravenous Once PRN Martha Mccarthy, MD        [facility]) injection 6.25 mg  6.25 mg Intravenous Once PRN Shanianadine MD Shari        labetalol (NORMODYNE;TRANDATE) injection 5 mg  5 mg Intravenous Q10 Min PRN Vernadine Shari, MD        hydrALAZINE (APRESOLINE) injection 5 mg  5 mg Intravenous Q10 Min PRN Vernadine Shari, MD        meperidine (DEMEROL) injection 12.5 mg  12.5 mg Intravenous Q5 Min PRN Vernadine MD Shari           Allergies:     Allergies   Allergen Reactions    Celecoxib Swelling    Codeine Nausea Only    Zoloft [Sertraline Hcl] Diarrhea       Problem List:    Patient Active Problem List   Diagnosis Code    Diabetes mellitus ([facility] E11.9    Chronic pain G89.29    Stroke ([facility] I63.9    NSTEMI (non-ST elevated myocardial infarction) ([facility] I21.4    Chronic back pain M54.9, G89.29    Hypertension I10    Carotid artery narrowings I65.29    High cholesterol E78.00    DDD (degenerative disc disease) [id number]    Arthritis M19.90    Diabetic polyneuropathy (HCC) E11.42    Hypothyroid E03.9    Flank pain R10.9    Insomnia G47.00    Carotid stenosis I65.29    Hyperkalemia E87.5    Hyponatremia E87.1    Anxiety F41.9    Sleep disorder G47.9    Pain medication agreement signed Z02.89    Hyperphosphatemia E83.39    Hypertensive urgency I16.0    Elevated lactic acid level R79.89    TIA (transient ischemic attack) G45.9       Past Medical History:        Diagnosis Date    Arthritis     Carotid stenosis     right    Chronic back pain     DDD (degenerative disc disease) lumbar    Diabetes mellitus ([facility] [address]     Hyperlipidemia     Hypertension     Neurop[facility] ([facility]    

## 2018-02-08 ENCOUNTER — CARE COORDINATION (OUTPATIENT)
Dept: CARE COORDINATION | Age: 68
End: 2018-02-08

## 2018-02-19 ENCOUNTER — CARE COORDINATION (OUTPATIENT)
Dept: CARE COORDINATION | Age: 68
End: 2018-02-19

## 2018-02-19 NOTE — CARE COORDINATION
Message left for patient to return call to this [facility] to follow up on chronic health conditions and to assess ongoing needs for care coordination. Need to discuss starting insulin with patient and cost for prescriptions. Multiple attempts made to contact patient with little success since November 2017. Will no longer follow for Care Coordination.        Kenyatta Braxton, RN   Care Coordinator  [phone]

## 2018-02-22 DIAGNOSIS — E03.8 OTHER SPECIFIED HYPOTHYROIDISM: ICD-10-CM

## 2018-02-22 RX ORDER — LEVOTHYROXINE SODIUM 137 UG/1
TABLET ORAL
Qty: 90 TABLET | Refills: 0 | OUTPATIENT
Start: 2018-02-22

## 2018-03-26 ENCOUNTER — OFFICE VISIT (OUTPATIENT)
Dept: FAMILY MEDICINE CLINIC | Age: 68
End: 2018-03-26

## 2018-03-26 VITALS
SYSTOLIC BLOOD PRESSURE: 160 MMHG | WEIGHT: 153 LBS | OXYGEN SATURATION: 91 % | DIASTOLIC BLOOD PRESSURE: 100 MMHG | TEMPERATURE: 98.1 F | BODY MASS INDEX: 27.98 KG/M2 | HEART RATE: 85 BPM

## 2018-03-26 DIAGNOSIS — F17.210 NICOTINE DEPENDENCE, CIGARETTES, UNCOMPLICATED: Primary | ICD-10-CM

## 2018-03-26 DIAGNOSIS — F41.9 ANXIETY: ICD-10-CM

## 2018-03-26 DIAGNOSIS — Z78.0 POST-MENOPAUSAL: ICD-10-CM

## 2018-03-26 DIAGNOSIS — E11.9 TYPE 2 DIABETES MELLITUS WITHOUT COMPLICATION, WITH LONG-TERM CURRENT USE OF INSULIN (HCC): ICD-10-CM

## 2018-03-26 DIAGNOSIS — Z79.4 TYPE 2 DIABETES MELLITUS WITHOUT COMPLICATION, WITH LONG-TERM CURRENT USE OF INSULIN (HCC): ICD-10-CM

## 2018-03-26 DIAGNOSIS — E03.8 OTHER SPECIFIED HYPOTHYROIDISM: ICD-10-CM

## 2018-03-26 DIAGNOSIS — J40 BRONCHITIS: ICD-10-CM

## 2018-03-26 DIAGNOSIS — Z12.39 SCREENING FOR BREAST CANCER: ICD-10-CM

## 2018-03-26 LAB — HBA1C MFR BLD: 6.7 %

## 2018-03-26 PROCEDURE — 83036 HEMOGLOBIN GLYCOSYLATED A1C: CPT | Performed by: NURSE PRACTITIONER

## 2018-03-26 PROCEDURE — 99214 OFFICE O/P EST MOD 30 MIN: CPT | Performed by: NURSE PRACTITIONER

## 2018-03-26 PROCEDURE — G0296 VISIT TO DETERM LDCT ELIG: HCPCS | Performed by: NURSE PRACTITIONER

## 2018-03-26 RX ORDER — DOXYCYCLINE HYCLATE 100 MG/1
100 CAPSULE ORAL 2 TIMES DAILY
Qty: 14 CAPSULE | Refills: 0 | Status: SHIPPED | OUTPATIENT
Start: 2018-03-26 | End: 2018-04-02

## 2018-03-26 RX ORDER — PREDNISONE 20 MG/1
TABLET ORAL
Qty: 10 TABLET | Refills: 0 | Status: ON HOLD | OUTPATIENT
Start: 2018-03-26 | End: 2018-05-04 | Stop reason: HOSPADM

## 2018-03-26 RX ORDER — BUSPIRONE HYDROCHLORIDE 7.5 MG/1
7.5 TABLET ORAL 3 TIMES DAILY
Qty: 90 TABLET | Refills: 1 | Status: SHIPPED | OUTPATIENT
Start: 2018-03-26 | End: 2018-08-09 | Stop reason: SDUPTHER

## 2018-03-26 RX ORDER — LEVOTHYROXINE SODIUM 137 UG/1
137 TABLET ORAL DAILY
Qty: 90 TABLET | Refills: 2 | Status: ON HOLD | OUTPATIENT
Start: 2018-03-26 | End: 2018-05-04

## 2018-03-26 ASSESSMENT — ENCOUNTER SYMPTOMS
RHINORRHEA: 0
COUGH: 1
WHEEZING: 0
SHORTNESS OF BREATH: 1

## 2018-03-26 NOTE — PROGRESS NOTES
complication, with long-term current use of insulin (HCC)  Microalbumin / Creatinine Urine Ratio    COMPREHENSIVE METABOLIC PANEL    CBC Auto Differential   6. Post-menopausal  [facility] Dexa Bone Density Scan   7. Screening for breast cancer  [facility] Breast Tomosynthesis 3D Imaging           Plan:      Maritza Stacy was seen today for diabetes. Diagnoses and all orders for this visit:    Nicotine dependence, cigarettes, uncomplicated - Patient has smoked for several years has had cough for the last 3 weeks patient is due for low risk CT scan we will order today. -     RI VISIT TO DISCUSS LUNG CA SCREEN W LDCT  -     CT Lung Screening; Future    Other specified hypothyroidism - patient was 2 months ago for a thyroid check patient never taken for TSH. Will order today patient's daughter will bring her back on Monday patient has been off medication for the last 3 days. -     levothyroxine (SYNTHROID) 137 MCG tablet; Take 1 tablet by mouth daily  -     TSH without Reflex; Future    Bronchitis - suspect patient has bronchitis patient does smoke. Will treat patient with Doxy and steroids. Patient educated to stop all NSAIDs while taking steroids patient states understanding.  -     doxycycline hyclate (VIBRAMYCIN) 100 MG capsule; Take 1 capsule by mouth 2 times daily for 7 days  -     predniSONE (DELTASONE) 20 MG tablet; 2 tabs for 5 days    Anxiety - patient has increased anxiety take Klonopin in the past. Patient has not been on Klonopin since chills and 17 will try BuSpar today. Patient educated medication can help with nurse patient states understanding. Patient does take trazodone night to help with sleep and nurse. -     busPIRone (BUSPAR) 7.5 MG tablet; Take 1 tablet by mouth 3 times daily    Type 2 diabetes mellitus without complication, with long-term current use of insulin ([address] - patient due for microalbumin will get today in office. Will get labs in the future. Patient's A1c is much better at 6.7.  No need to start

## 2018-03-27 LAB
CREATININE URINE: 38 MG/DL (ref 28–259)
MICROALBUMIN UR-MCNC: 522.5 MG/DL
MICROALBUMIN/CREAT UR-RTO: ABNORMAL MG/G (ref 0–30)

## 2018-04-04 DIAGNOSIS — E78.00 HIGH CHOLESTEROL: ICD-10-CM

## 2018-04-04 DIAGNOSIS — I10 ESSENTIAL HYPERTENSION: ICD-10-CM

## 2018-04-04 RX ORDER — ATORVASTATIN CALCIUM 10 MG/1
10 TABLET, FILM COATED ORAL DAILY
Qty: 90 TABLET | Refills: 1 | Status: SHIPPED | OUTPATIENT
Start: 2018-04-04 | End: 2018-08-09 | Stop reason: SDUPTHER

## 2018-04-04 RX ORDER — AMLODIPINE BESYLATE 10 MG/1
10 TABLET ORAL DAILY
Qty: 90 TABLET | Refills: 1 | Status: SHIPPED | OUTPATIENT
Start: 2018-04-04 | End: 2018-08-09 | Stop reason: SDUPTHER

## 2018-04-04 RX ORDER — HYDROCHLOROTHIAZIDE 25 MG/1
25 TABLET ORAL DAILY
Qty: 90 TABLET | Refills: 1 | Status: ON HOLD | OUTPATIENT
Start: 2018-04-04 | End: 2018-05-04 | Stop reason: HOSPADM

## 2018-04-24 PROBLEM — Z72.0 TOBACCO ABUSE: Chronic | Status: ACTIVE | Noted: 2018-04-24

## 2018-04-24 PROBLEM — R79.89 ELEVATED BRAIN NATRIURETIC PEPTIDE (BNP) LEVEL: Status: ACTIVE | Noted: 2018-04-24

## 2018-04-24 PROBLEM — J96.02 ACUTE RESPIRATORY FAILURE WITH HYPOXIA AND HYPERCAPNIA (HCC): Status: ACTIVE | Noted: 2018-04-24

## 2018-04-24 PROBLEM — N18.30 CKD (CHRONIC KIDNEY DISEASE) STAGE 3, GFR 30-59 ML/MIN (HCC): Chronic | Status: ACTIVE | Noted: 2018-04-24

## 2018-04-24 PROBLEM — S82.831A CLOSED FRACTURE OF RIGHT DISTAL FIBULA: Status: ACTIVE | Noted: 2018-04-24

## 2018-04-24 PROBLEM — R11.2 NAUSEA, VOMITING, AND DIARRHEA: Status: ACTIVE | Noted: 2018-04-24

## 2018-04-24 PROBLEM — R65.10 SIRS (SYSTEMIC INFLAMMATORY RESPONSE SYNDROME) (HCC): Status: ACTIVE | Noted: 2018-04-24

## 2018-04-24 PROBLEM — E87.8 HYPOCHLOREMIA: Status: ACTIVE | Noted: 2018-04-24

## 2018-04-24 PROBLEM — R55 SYNCOPE AND COLLAPSE: Status: ACTIVE | Noted: 2018-04-24

## 2018-04-24 PROBLEM — D75.839 THROMBOCYTOSIS: Status: ACTIVE | Noted: 2018-04-24

## 2018-04-24 PROBLEM — R19.7 NAUSEA, VOMITING, AND DIARRHEA: Status: ACTIVE | Noted: 2018-04-24

## 2018-04-24 PROBLEM — J90 PLEURAL EFFUSION ON LEFT: Status: ACTIVE | Noted: 2018-04-24

## 2018-04-24 PROBLEM — S82.53XA MEDIAL MALLEOLAR FRACTURE: Status: ACTIVE | Noted: 2018-04-24

## 2018-04-24 PROBLEM — R77.8 ELEVATED TROPONIN: Status: ACTIVE | Noted: 2018-04-24

## 2018-04-24 PROBLEM — J96.01 ACUTE RESPIRATORY FAILURE WITH HYPOXIA AND HYPERCAPNIA (HCC): Status: ACTIVE | Noted: 2018-04-24

## 2018-04-24 PROBLEM — S92.491A OTHER FRACTURE OF RIGHT GREAT TOE, INITIAL ENCOUNTER FOR CLOSED FRACTURE: Status: ACTIVE | Noted: 2018-04-24

## 2018-04-24 PROBLEM — S09.90XA HEAD TRAUMA: Status: ACTIVE | Noted: 2018-04-24

## 2018-04-24 PROBLEM — R94.31 QT PROLONGATION: Status: ACTIVE | Noted: 2018-04-24

## 2018-04-25 LAB
LEFT VENTRICULAR EJECTION FRACTION HIGH VALUE: 50 %
LEFT VENTRICULAR EJECTION FRACTION MODE: NORMAL
LEFT VENTRICULAR EJECTION FRACTION MODE: NORMAL
LV EF: 45 %

## 2018-04-27 ENCOUNTER — TELEPHONE (OUTPATIENT)
Dept: CASE MANAGEMENT | Age: 68
End: 2018-04-27

## 2018-05-12 PROBLEM — R07.9 CHEST PAIN: Status: ACTIVE | Noted: 2018-05-12

## 2018-05-12 PROBLEM — R06.02 SOB (SHORTNESS OF BREATH): Status: ACTIVE | Noted: 2018-05-12

## 2018-05-23 ENCOUNTER — OFFICE VISIT (OUTPATIENT)
Dept: CARDIOLOGY CLINIC | Age: 68
End: 2018-05-23

## 2018-05-23 VITALS
SYSTOLIC BLOOD PRESSURE: 108 MMHG | DIASTOLIC BLOOD PRESSURE: 60 MMHG | HEIGHT: 61 IN | HEART RATE: 80 BPM | BODY MASS INDEX: 27 KG/M2 | WEIGHT: 143 LBS | OXYGEN SATURATION: 96 %

## 2018-05-23 DIAGNOSIS — Z72.0 TOBACCO ABUSE: ICD-10-CM

## 2018-05-23 DIAGNOSIS — I10 ESSENTIAL HYPERTENSION: ICD-10-CM

## 2018-05-23 DIAGNOSIS — N18.30 CKD (CHRONIC KIDNEY DISEASE) STAGE 3, GFR 30-59 ML/MIN (HCC): ICD-10-CM

## 2018-05-23 DIAGNOSIS — I50.32 CHRONIC DIASTOLIC CONGESTIVE HEART FAILURE (HCC): Primary | ICD-10-CM

## 2018-05-23 DIAGNOSIS — R06.02 SHORTNESS OF BREATH: ICD-10-CM

## 2018-05-23 PROCEDURE — 99214 OFFICE O/P EST MOD 30 MIN: CPT | Performed by: NURSE PRACTITIONER

## 2018-05-24 PROBLEM — R77.8 ELEVATED TROPONIN: Status: RESOLVED | Noted: 2018-04-24 | Resolved: 2018-05-24

## 2018-05-29 LAB
BUN BLDV-MCNC: 30 MG/DL
CALCIUM SERPL-MCNC: 8.2 MG/DL
CHLORIDE BLD-SCNC: 101 MMOL/L
CO2: 25 MMOL/L
CREAT SERPL-MCNC: 1.9 MG/DL
GFR CALCULATED: NORMAL
GLUCOSE BLD-MCNC: 48 MG/DL
POTASSIUM SERPL-SCNC: 4.5 MMOL/L
SODIUM BLD-SCNC: 138 MMOL/L

## 2018-06-05 ENCOUNTER — TELEPHONE (OUTPATIENT)
Dept: FAMILY MEDICINE CLINIC | Age: 68
End: 2018-06-05

## 2018-06-06 DIAGNOSIS — E11.42 TYPE 2 DIABETES MELLITUS WITH DIABETIC POLYNEUROPATHY, WITHOUT LONG-TERM CURRENT USE OF INSULIN (HCC): ICD-10-CM

## 2018-06-12 ENCOUNTER — TELEPHONE (OUTPATIENT)
Dept: FAMILY MEDICINE CLINIC | Age: 68
End: 2018-06-12

## 2018-06-12 DIAGNOSIS — S82.54XA CLOSED NONDISPLACED FRACTURE OF MEDIAL MALLEOLUS OF RIGHT TIBIA, INITIAL ENCOUNTER: ICD-10-CM

## 2018-06-12 RX ORDER — OXYCODONE HYDROCHLORIDE AND ACETAMINOPHEN 5; 325 MG/1; MG/1
1 TABLET ORAL EVERY 6 HOURS PRN
Qty: 28 TABLET | Refills: 0 | Status: CANCELLED | OUTPATIENT
Start: 2018-06-12 | End: 2018-06-19

## 2018-06-13 ENCOUNTER — TELEPHONE (OUTPATIENT)
Dept: FAMILY MEDICINE CLINIC | Age: 68
End: 2018-06-13

## 2018-06-15 ENCOUNTER — TELEPHONE (OUTPATIENT)
Dept: FAMILY MEDICINE CLINIC | Age: 68
End: 2018-06-15

## 2018-06-18 ENCOUNTER — CARE COORDINATION (OUTPATIENT)
Dept: CARE COORDINATION | Age: 68
End: 2018-06-18

## 2018-06-18 NOTE — CARE COORDINATION
ACC: Alva Mora, RN  CM Risk Score: 7  Denny Mortality Risk Score: 10.52    Summary/Assessment:   Referral from PCP NP re need for services. Discussion with C RN and Social work services are available with Luigi Graff   VO for referral given to Luigi Graff RN for Social Work referral with [address]    [facility] contacted re any agencies for support. Call to patient number - VM of someone else came up. No message left    [employer] Case mg[employer] called re eligibility for [employer] case mgmt services -  Several calls to Roseline Kohli. Supervisor Sumi Gomez RN will have one of her team reach out to [facility] and let me know.      Plan:   Daniel consult for service determination and collaboration  Continue Follow up         Future Appointments  Date Time Provider Dannie Chiang   7/19/2018 12:30 PM SHELBY Bradshaw - DANNY VELEZ

## 2018-06-20 DIAGNOSIS — S82.54XA CLOSED NONDISPLACED FRACTURE OF MEDIAL MALLEOLUS OF RIGHT TIBIA, INITIAL ENCOUNTER: ICD-10-CM

## 2018-06-20 RX ORDER — OXYCODONE HYDROCHLORIDE AND ACETAMINOPHEN 5; 325 MG/1; MG/1
1 TABLET ORAL EVERY 6 HOURS PRN
Qty: 28 TABLET | Refills: 0 | OUTPATIENT
Start: 2018-06-20 | End: 2018-06-27

## 2018-06-25 ENCOUNTER — TELEPHONE (OUTPATIENT)
Dept: CARDIOLOGY CLINIC | Age: 68
End: 2018-06-25

## 2018-06-25 NOTE — TELEPHONE ENCOUNTER
Kim Gil from [facility] called to inform CAITLIN Vargas that this patient was referred to them by [facility] and the pt had an appt today with them, and did not show.

## 2018-07-02 ENCOUNTER — TELEPHONE (OUTPATIENT)
Dept: FAMILY MEDICINE CLINIC | Age: 68
End: 2018-07-02

## 2018-07-05 ENCOUNTER — CARE COORDINATION (OUTPATIENT)
Dept: CARE COORDINATION | Age: 68
End: 2018-07-05

## 2018-07-05 NOTE — CARE COORDINATION
[facility]  calls and reports that patient referral to in home intensive case mgmt is still pending date of assessment. She will alert RNCC and Luigi Graff RN when this date is determined.

## 2018-07-10 ENCOUNTER — TELEPHONE (OUTPATIENT)
Dept: FAMILY MEDICINE CLINIC | Age: 68
End: 2018-07-10

## 2018-07-18 ENCOUNTER — OFFICE VISIT (OUTPATIENT)
Dept: FAMILY MEDICINE CLINIC | Age: 68
End: 2018-07-18

## 2018-07-18 VITALS
HEART RATE: 76 BPM | TEMPERATURE: 98.6 F | DIASTOLIC BLOOD PRESSURE: 62 MMHG | SYSTOLIC BLOOD PRESSURE: 138 MMHG | BODY MASS INDEX: 26.45 KG/M2 | WEIGHT: 140 LBS

## 2018-07-18 DIAGNOSIS — R19.7 DIARRHEA, UNSPECIFIED TYPE: Primary | ICD-10-CM

## 2018-07-18 PROCEDURE — 99213 OFFICE O/P EST LOW 20 MIN: CPT | Performed by: NURSE PRACTITIONER

## 2018-07-18 ASSESSMENT — ENCOUNTER SYMPTOMS
SHORTNESS OF BREATH: 0
NAUSEA: 0
DIARRHEA: 1
ABDOMINAL PAIN: 1
VOMITING: 0

## 2018-07-18 NOTE — PROGRESS NOTES
Skin is warm and dry. Psychiatric: She has a normal mood and affect. Her behavior is normal. Judgment and thought content normal.   Nursing note and vitals reviewed. Vitals:    07/18/18 1410   BP: 138/62   Pulse: 76   Temp: 98.6 °F (37 °C)       Assessment:  Encounter Diagnosis   Name Primary?  Diarrhea, unspecified type Yes       Plan:  1. Diarrhea, unspecified type  Discussed taking imodium as directed on the packaging. Limit vegetables and bean soup.  May try gas x- if diarrhea persists may need to see GI

## 2018-07-27 ENCOUNTER — CARE COORDINATION (OUTPATIENT)
Dept: CARE COORDINATION | Age: 68
End: 2018-07-27

## 2018-08-03 PROCEDURE — G0179 MD RECERTIFICATION HHA PT: HCPCS | Performed by: NURSE PRACTITIONER

## 2018-08-06 NOTE — CARE COORDINATION
Call to pt primary number and LM with CB Number to follow up with patient re [employer] service. Unable to reach pt and not able to have calls returned.

## 2018-08-06 NOTE — CARE COORDINATION
08/06/18  Call to patient. Return call this afternoon. Pt is agreeable to have [employer] assessment  Would like [email or web address] RN call her to schedule  [email or web address] Gracie Cruz RN called and LM advising to call pt later in day d/t phone issues. Plan:  No further outreach. Available for curbside assistance as needed.

## 2018-08-08 ENCOUNTER — TELEPHONE (OUTPATIENT)
Dept: FAMILY MEDICINE CLINIC | Age: 68
End: 2018-08-08

## 2018-08-08 DIAGNOSIS — E11.42 TYPE 2 DIABETES MELLITUS WITH DIABETIC POLYNEUROPATHY, UNSPECIFIED WHETHER LONG TERM INSULIN USE (HCC): Primary | ICD-10-CM

## 2018-08-08 DIAGNOSIS — E11.9 TYPE 2 DIABETES MELLITUS WITHOUT COMPLICATION, WITH LONG-TERM CURRENT USE OF INSULIN (HCC): Primary | Chronic | ICD-10-CM

## 2018-08-08 DIAGNOSIS — Z79.4 TYPE 2 DIABETES MELLITUS WITHOUT COMPLICATION, WITH LONG-TERM CURRENT USE OF INSULIN (HCC): Primary | Chronic | ICD-10-CM

## 2018-08-08 DIAGNOSIS — E87.1 HYPONATREMIA: ICD-10-CM

## 2018-08-08 RX ORDER — IBUPROFEN 200 MG
1 TABLET ORAL 3 TIMES DAILY
Qty: 100 EACH | Refills: 3 | Status: ON HOLD | OUTPATIENT
Start: 2018-08-08 | End: 2019-01-04 | Stop reason: HOSPADM

## 2018-08-09 DIAGNOSIS — I10 ESSENTIAL HYPERTENSION: ICD-10-CM

## 2018-08-09 DIAGNOSIS — I21.4 NSTEMI (NON-ST ELEVATED MYOCARDIAL INFARCTION) (HCC): ICD-10-CM

## 2018-08-09 DIAGNOSIS — E78.00 HIGH CHOLESTEROL: ICD-10-CM

## 2018-08-09 DIAGNOSIS — F41.9 ANXIETY: ICD-10-CM

## 2018-08-09 RX ORDER — ATORVASTATIN CALCIUM 10 MG/1
10 TABLET, FILM COATED ORAL DAILY
Qty: 90 TABLET | Refills: 0 | Status: ON HOLD | OUTPATIENT
Start: 2018-08-09 | End: 2019-01-04 | Stop reason: HOSPADM

## 2018-08-09 RX ORDER — PAROXETINE HYDROCHLORIDE 20 MG/1
TABLET, FILM COATED ORAL
Qty: 90 TABLET | Refills: 0 | Status: SHIPPED | OUTPATIENT
Start: 2018-08-09

## 2018-08-09 RX ORDER — LISINOPRIL 40 MG/1
TABLET ORAL
Qty: 90 TABLET | Refills: 0 | Status: ON HOLD | OUTPATIENT
Start: 2018-08-09 | End: 2019-01-04 | Stop reason: HOSPADM

## 2018-08-09 RX ORDER — TRAZODONE HYDROCHLORIDE 150 MG/1
150 TABLET ORAL NIGHTLY
Qty: 90 TABLET | Refills: 0 | Status: SHIPPED | OUTPATIENT
Start: 2018-08-09 | End: 2018-09-28 | Stop reason: SDUPTHER

## 2018-08-09 RX ORDER — BUSPIRONE HYDROCHLORIDE 7.5 MG/1
7.5 TABLET ORAL 3 TIMES DAILY
Qty: 270 TABLET | Refills: 0 | Status: ON HOLD | OUTPATIENT
Start: 2018-08-09 | End: 2019-01-04 | Stop reason: HOSPADM

## 2018-08-09 RX ORDER — CLOPIDOGREL BISULFATE 75 MG/1
75 TABLET ORAL DAILY
Qty: 90 TABLET | Refills: 0 | Status: ON HOLD | OUTPATIENT
Start: 2018-08-09 | End: 2018-10-19 | Stop reason: HOSPADM

## 2018-08-09 RX ORDER — AMLODIPINE BESYLATE 10 MG/1
10 TABLET ORAL DAILY
Qty: 90 TABLET | Refills: 0 | Status: ON HOLD | OUTPATIENT
Start: 2018-08-09 | End: 2019-01-04 | Stop reason: HOSPADM

## 2018-08-09 RX ORDER — HYDRALAZINE HYDROCHLORIDE 25 MG/1
25 TABLET, FILM COATED ORAL EVERY 8 HOURS SCHEDULED
Qty: 90 TABLET | Refills: 0 | Status: SHIPPED | OUTPATIENT
Start: 2018-08-09 | End: 2018-09-28 | Stop reason: SDUPTHER

## 2018-08-09 RX ORDER — FUROSEMIDE 40 MG/1
40 TABLET ORAL DAILY
Qty: 90 TABLET | Refills: 0 | Status: SHIPPED | OUTPATIENT
Start: 2018-08-09 | End: 2018-10-01 | Stop reason: CLARIF

## 2018-08-13 ENCOUNTER — HOSPITAL ENCOUNTER (OUTPATIENT)
Age: 68
Setting detail: SPECIMEN
Discharge: HOME | End: 2018-08-13
Payer: MEDICARE

## 2018-08-13 ENCOUNTER — TELEPHONE (OUTPATIENT)
Dept: PRIMARY CARE CLINIC | Age: 68
End: 2018-08-13

## 2018-08-13 LAB
A/G RATIO: 1 (ref 1.1–2.2)
ALBUMIN SERPL-MCNC: 2.7 G/DL (ref 3.4–5)
ALP BLD-CCNC: 107 U/L (ref 40–129)
ALT SERPL-CCNC: 8 U/L (ref 10–40)
ANION GAP SERPL CALCULATED.3IONS-SCNC: 14 MMOL/L (ref 3–16)
AST SERPL-CCNC: 12 U/L (ref 15–37)
BASOPHILS ABSOLUTE: 0.1 K/UL (ref 0–0.2)
BASOPHILS RELATIVE PERCENT: 0.6 %
BILIRUB SERPL-MCNC: <0.2 MG/DL (ref 0–1)
BUN BLDV-MCNC: 27 MG/DL (ref 7–20)
CALCIUM SERPL-MCNC: 8.5 MG/DL (ref 8.3–10.6)
CHLORIDE BLD-SCNC: 94 MMOL/L (ref 99–110)
CO2: 26 MMOL/L (ref 21–32)
CREAT SERPL-MCNC: 1.8 MG/DL (ref 0.6–1.2)
EOSINOPHILS ABSOLUTE: 0 K/UL (ref 0–0.6)
EOSINOPHILS RELATIVE PERCENT: 0 %
GFR AFRICAN AMERICAN: 34
GFR NON-AFRICAN AMERICAN: 28
GLOBULIN: 2.7 G/DL
GLUCOSE BLD-MCNC: 438 MG/DL (ref 70–99)
HCT VFR BLD CALC: 37.3 % (ref 36–48)
HEMOGLOBIN: 12.2 G/DL (ref 12–16)
LYMPHOCYTES ABSOLUTE: 1.5 K/UL (ref 1–5.1)
LYMPHOCYTES RELATIVE PERCENT: 15.8 %
MCH RBC QN AUTO: 26 PG (ref 26–34)
MCHC RBC AUTO-ENTMCNC: 32.8 G/DL (ref 31–36)
MCV RBC AUTO: 79.5 FL (ref 80–100)
MONOCYTES ABSOLUTE: 0.5 K/UL (ref 0–1.3)
MONOCYTES RELATIVE PERCENT: 4.9 %
NEUTROPHILS ABSOLUTE: 7.6 K/UL (ref 1.7–7.7)
NEUTROPHILS RELATIVE PERCENT: 78.7 %
PDW BLD-RTO: 15.9 % (ref 12.4–15.4)
PLATELET # BLD: 308 K/UL (ref 135–450)
PMV BLD AUTO: 8 FL (ref 5–10.5)
POTASSIUM SERPL-SCNC: 4.2 MMOL/L (ref 3.5–5.1)
RBC # BLD: 4.69 M/UL (ref 4–5.2)
SODIUM BLD-SCNC: 134 MMOL/L (ref 136–145)
TOTAL PROTEIN: 5.4 G/DL (ref 6.4–8.2)
WBC # BLD: 9.6 K/UL (ref 4–11)

## 2018-08-13 PROCEDURE — 83036 HEMOGLOBIN GLYCOSYLATED A1C: CPT

## 2018-08-13 PROCEDURE — 80053 COMPREHEN METABOLIC PANEL: CPT

## 2018-08-13 PROCEDURE — 36415 COLL VENOUS BLD VENIPUNCTURE: CPT

## 2018-08-13 PROCEDURE — 85025 COMPLETE CBC W/AUTO DIFF WBC: CPT

## 2018-08-14 ENCOUNTER — TELEPHONE (OUTPATIENT)
Dept: FAMILY MEDICINE CLINIC | Age: 68
End: 2018-08-14

## 2018-08-14 LAB
ESTIMATED AVERAGE GLUCOSE: 205.9 MG/DL
HBA1C MFR BLD: 8.8 %

## 2018-08-15 DIAGNOSIS — E03.8 OTHER SPECIFIED HYPOTHYROIDISM: ICD-10-CM

## 2018-08-15 NOTE — TELEPHONE ENCOUNTER
William Birmingham called requesting a refill on the following medications:  Requested Prescriptions     Pending Prescriptions Disp Refills    levothyroxine (SYNTHROID) 150 MCG tablet 30 tablet 0     Sig: Take 1 tablet by mouth daily     Pharmacy verified:  .[email or web address]      Date of last visit: 07/18/2018  Date of next visit (if applicable): 2/56/7205        Patient stated she took the last of the prescription today.

## 2018-08-16 RX ORDER — LEVOTHYROXINE SODIUM 0.15 MG/1
150 TABLET ORAL DAILY
Qty: 30 TABLET | Refills: 0 | Status: SHIPPED | OUTPATIENT
Start: 2018-08-16 | End: 2018-09-16 | Stop reason: SDUPTHER

## 2018-08-17 DIAGNOSIS — N18.9 CHRONIC KIDNEY DISEASE, UNSPECIFIED CKD STAGE: ICD-10-CM

## 2018-08-17 DIAGNOSIS — E03.9 HYPOTHYROIDISM, UNSPECIFIED TYPE: Primary | ICD-10-CM

## 2018-08-21 ENCOUNTER — HOSPITAL ENCOUNTER (OUTPATIENT)
Age: 68
Setting detail: SPECIMEN
Discharge: HOME | End: 2018-08-21
Payer: MEDICARE

## 2018-08-21 ENCOUNTER — TELEPHONE (OUTPATIENT)
Dept: FAMILY MEDICINE CLINIC | Age: 68
End: 2018-08-21

## 2018-08-21 DIAGNOSIS — K92.1 BLACK STOOL: Primary | ICD-10-CM

## 2018-08-21 LAB
ANION GAP SERPL CALCULATED.3IONS-SCNC: 12 MMOL/L (ref 3–16)
BASOPHILS ABSOLUTE: 0.1 K/UL (ref 0–0.2)
BASOPHILS RELATIVE PERCENT: 0.8 %
BUN BLDV-MCNC: 28 MG/DL (ref 7–20)
CALCIUM SERPL-MCNC: 8.5 MG/DL (ref 8.3–10.6)
CHLORIDE BLD-SCNC: 97 MMOL/L (ref 99–110)
CO2: 25 MMOL/L (ref 21–32)
CREAT SERPL-MCNC: 1.8 MG/DL (ref 0.6–1.2)
EOSINOPHILS ABSOLUTE: 0 K/UL (ref 0–0.6)
EOSINOPHILS RELATIVE PERCENT: 0.1 %
GFR AFRICAN AMERICAN: 34
GFR NON-AFRICAN AMERICAN: 28
GLUCOSE BLD-MCNC: 308 MG/DL (ref 70–99)
HCT VFR BLD CALC: 37.2 % (ref 36–48)
HEMOGLOBIN: 12.2 G/DL (ref 12–16)
LYMPHOCYTES ABSOLUTE: 1.4 K/UL (ref 1–5.1)
LYMPHOCYTES RELATIVE PERCENT: 19.2 %
MCH RBC QN AUTO: 26.3 PG (ref 26–34)
MCHC RBC AUTO-ENTMCNC: 32.9 G/DL (ref 31–36)
MCV RBC AUTO: 80 FL (ref 80–100)
MONOCYTES ABSOLUTE: 0.4 K/UL (ref 0–1.3)
MONOCYTES RELATIVE PERCENT: 6.2 %
NEUTROPHILS ABSOLUTE: 5.3 K/UL (ref 1.7–7.7)
NEUTROPHILS RELATIVE PERCENT: 73.7 %
PDW BLD-RTO: 16.4 % (ref 12.4–15.4)
PLATELET # BLD: 294 K/UL (ref 135–450)
PMV BLD AUTO: 7.9 FL (ref 5–10.5)
POTASSIUM SERPL-SCNC: 4.3 MMOL/L (ref 3.5–5.1)
RBC # BLD: 4.64 M/UL (ref 4–5.2)
SODIUM BLD-SCNC: 134 MMOL/L (ref 136–145)
T4 FREE: 0.9 NG/DL (ref 0.9–1.8)
TSH SERPL DL<=0.05 MIU/L-ACNC: 29.91 UIU/ML (ref 0.27–4.2)
WBC # BLD: 7.2 K/UL (ref 4–11)

## 2018-08-21 PROCEDURE — 80048 BASIC METABOLIC PNL TOTAL CA: CPT

## 2018-08-21 PROCEDURE — 84443 ASSAY THYROID STIM HORMONE: CPT

## 2018-08-21 PROCEDURE — 84439 ASSAY OF FREE THYROXINE: CPT

## 2018-08-21 PROCEDURE — 85025 COMPLETE CBC W/AUTO DIFF WBC: CPT

## 2018-08-28 ENCOUNTER — CARE COORDINATION (OUTPATIENT)
Dept: FAMILY MEDICINE CLINIC | Age: 68
End: 2018-08-28

## 2018-08-29 ENCOUNTER — TELEPHONE (OUTPATIENT)
Dept: FAMILY MEDICINE CLINIC | Age: 68
End: 2018-08-29

## 2018-09-06 ENCOUNTER — TELEPHONE (OUTPATIENT)
Dept: FAMILY MEDICINE CLINIC | Age: 68
End: 2018-09-06

## 2018-09-06 NOTE — TELEPHONE ENCOUNTER
Marifer Haq with [address] called and states that she attempted to see the patient today but no one would answer the door. She said that she could see that the patient was asleep on the couch. She said that the patient did not answer her phone.  She said that the TV was on and that she could hear it

## 2018-09-07 ENCOUNTER — OFFICE VISIT (OUTPATIENT)
Dept: FAMILY MEDICINE CLINIC | Age: 68
End: 2018-09-07

## 2018-09-07 VITALS — SYSTOLIC BLOOD PRESSURE: 106 MMHG | HEART RATE: 72 BPM | DIASTOLIC BLOOD PRESSURE: 60 MMHG | OXYGEN SATURATION: 95 %

## 2018-09-07 DIAGNOSIS — M54.41 CHRONIC RIGHT-SIDED LOW BACK PAIN WITH RIGHT-SIDED SCIATICA: ICD-10-CM

## 2018-09-07 DIAGNOSIS — I10 ESSENTIAL HYPERTENSION: ICD-10-CM

## 2018-09-07 DIAGNOSIS — E11.22 TYPE 2 DIABETES MELLITUS WITH CHRONIC KIDNEY DISEASE, WITH LONG-TERM CURRENT USE OF INSULIN, UNSPECIFIED CKD STAGE (HCC): Primary | ICD-10-CM

## 2018-09-07 DIAGNOSIS — Z23 ENCOUNTER FOR IMMUNIZATION: ICD-10-CM

## 2018-09-07 DIAGNOSIS — E03.9 HYPOTHYROIDISM, UNSPECIFIED TYPE: ICD-10-CM

## 2018-09-07 DIAGNOSIS — G89.29 CHRONIC RIGHT-SIDED LOW BACK PAIN WITH RIGHT-SIDED SCIATICA: ICD-10-CM

## 2018-09-07 DIAGNOSIS — Z79.899 ENCOUNTER FOR LONG-TERM (CURRENT) USE OF MEDICATIONS: ICD-10-CM

## 2018-09-07 DIAGNOSIS — J01.11 ACUTE RECURRENT FRONTAL SINUSITIS: ICD-10-CM

## 2018-09-07 DIAGNOSIS — J41.1 MUCOPURULENT CHRONIC BRONCHITIS (HCC): ICD-10-CM

## 2018-09-07 DIAGNOSIS — Z79.4 TYPE 2 DIABETES MELLITUS WITH CHRONIC KIDNEY DISEASE, WITH LONG-TERM CURRENT USE OF INSULIN, UNSPECIFIED CKD STAGE (HCC): Primary | ICD-10-CM

## 2018-09-07 LAB
A/G RATIO: 1.1 (ref 1.1–2.2)
ALBUMIN SERPL-MCNC: 3.2 G/DL (ref 3.4–5)
ALP BLD-CCNC: 90 U/L (ref 40–129)
ALT SERPL-CCNC: <5 U/L (ref 10–40)
ANION GAP SERPL CALCULATED.3IONS-SCNC: 15 MMOL/L (ref 3–16)
AST SERPL-CCNC: 12 U/L (ref 15–37)
BILIRUB SERPL-MCNC: <0.2 MG/DL (ref 0–1)
BUN BLDV-MCNC: 31 MG/DL (ref 7–20)
CALCIUM SERPL-MCNC: 8.4 MG/DL (ref 8.3–10.6)
CHLORIDE BLD-SCNC: 83 MMOL/L (ref 99–110)
CO2: 29 MMOL/L (ref 21–32)
CREAT SERPL-MCNC: 2.3 MG/DL (ref 0.6–1.2)
GFR AFRICAN AMERICAN: 26
GFR NON-AFRICAN AMERICAN: 21
GLOBULIN: 2.8 G/DL
GLUCOSE BLD-MCNC: 231 MG/DL (ref 70–99)
POTASSIUM SERPL-SCNC: 3.8 MMOL/L (ref 3.5–5.1)
SODIUM BLD-SCNC: 127 MMOL/L (ref 136–145)
TOTAL PROTEIN: 6 G/DL (ref 6.4–8.2)
TSH SERPL DL<=0.05 MIU/L-ACNC: 8.64 UIU/ML (ref 0.27–4.2)

## 2018-09-07 PROCEDURE — 2022F DILAT RTA XM EVC RTNOPTHY: CPT | Performed by: NURSE PRACTITIONER

## 2018-09-07 PROCEDURE — G0009 ADMIN PNEUMOCOCCAL VACCINE: HCPCS | Performed by: NURSE PRACTITIONER

## 2018-09-07 PROCEDURE — 4040F PNEUMOC VAC/ADMIN/RCVD: CPT | Performed by: NURSE PRACTITIONER

## 2018-09-07 PROCEDURE — G8926 SPIRO NO PERF OR DOC: HCPCS | Performed by: NURSE PRACTITIONER

## 2018-09-07 PROCEDURE — 3023F SPIROM DOC REV: CPT | Performed by: NURSE PRACTITIONER

## 2018-09-07 PROCEDURE — G8598 ASA/ANTIPLAT THER USED: HCPCS | Performed by: NURSE PRACTITIONER

## 2018-09-07 PROCEDURE — 1101F PT FALLS ASSESS-DOCD LE1/YR: CPT | Performed by: NURSE PRACTITIONER

## 2018-09-07 PROCEDURE — 36415 COLL VENOUS BLD VENIPUNCTURE: CPT | Performed by: NURSE PRACTITIONER

## 2018-09-07 PROCEDURE — 3017F COLORECTAL CA SCREEN DOC REV: CPT | Performed by: NURSE PRACTITIONER

## 2018-09-07 PROCEDURE — 4004F PT TOBACCO SCREEN RCVD TLK: CPT | Performed by: NURSE PRACTITIONER

## 2018-09-07 PROCEDURE — 90670 PCV13 VACCINE IM: CPT | Performed by: NURSE PRACTITIONER

## 2018-09-07 PROCEDURE — G8419 CALC BMI OUT NRM PARAM NOF/U: HCPCS | Performed by: NURSE PRACTITIONER

## 2018-09-07 PROCEDURE — G8427 DOCREV CUR MEDS BY ELIG CLIN: HCPCS | Performed by: NURSE PRACTITIONER

## 2018-09-07 PROCEDURE — 1123F ACP DISCUSS/DSCN MKR DOCD: CPT | Performed by: NURSE PRACTITIONER

## 2018-09-07 PROCEDURE — G8400 PT W/DXA NO RESULTS DOC: HCPCS | Performed by: NURSE PRACTITIONER

## 2018-09-07 PROCEDURE — 1090F PRES/ABSN URINE INCON ASSESS: CPT | Performed by: NURSE PRACTITIONER

## 2018-09-07 PROCEDURE — 99214 OFFICE O/P EST MOD 30 MIN: CPT | Performed by: NURSE PRACTITIONER

## 2018-09-07 PROCEDURE — 3045F PR MOST RECENT HEMOGLOBIN A1C LEVEL 7.0-9.0%: CPT | Performed by: NURSE PRACTITIONER

## 2018-09-07 RX ORDER — AMOXICILLIN AND CLAVULANATE POTASSIUM 875; 125 MG/1; MG/1
1 TABLET, FILM COATED ORAL 2 TIMES DAILY
Qty: 20 TABLET | Refills: 0 | Status: SHIPPED | OUTPATIENT
Start: 2018-09-07 | End: 2018-09-17

## 2018-09-07 RX ORDER — ALBUTEROL SULFATE 90 UG/1
2 AEROSOL, METERED RESPIRATORY (INHALATION) EVERY 4 HOURS PRN
Qty: 1 INHALER | Refills: 1 | Status: SHIPPED | OUTPATIENT
Start: 2018-09-07 | End: 2019-09-07

## 2018-09-07 RX ORDER — IPRATROPIUM BROMIDE AND ALBUTEROL SULFATE 2.5; .5 MG/3ML; MG/3ML
3 SOLUTION RESPIRATORY (INHALATION) EVERY 4 HOURS PRN
Qty: 360 ML | Refills: 0 | Status: SHIPPED | OUTPATIENT
Start: 2018-09-07

## 2018-09-07 RX ORDER — GLIMEPIRIDE 2 MG/1
2 TABLET ORAL 2 TIMES DAILY
Qty: 30 TABLET | Refills: 3 | Status: ON HOLD | OUTPATIENT
Start: 2018-09-07 | End: 2018-10-17

## 2018-09-07 ASSESSMENT — ENCOUNTER SYMPTOMS
COUGH: 1
SHORTNESS OF BREATH: 1
SORE THROAT: 1
WHEEZING: 1
SINUS PAIN: 1

## 2018-09-07 NOTE — PROGRESS NOTES
Vitals reviewed. Assessment:       Diagnosis Orders   1. Type 2 diabetes mellitus with chronic kidney disease, with long-term current use of insulin, unspecified CKD stage ([facility])  COMPREHENSIVE METABOLIC PANEL    SITagliptin (JANUVIA) 25 MG tablet    glimepiride (AMARYL) 2 MG tablet   2. Acute recurrent frontal sinusitis  amoxicillin-clavulanate (AUGMENTIN) 875-125 MG per tablet   3. Essential hypertension  COMPREHENSIVE METABOLIC PANEL   4. Mucopurulent chronic bronchitis (HCC)  ipratropium-albuterol (DUONEB) 0.5-2.5 (3) MG/3ML SOLN nebulizer solution    Respiratory Therapy Supplies (NEBULIZER COMPRESSOR) KIT    albuterol sulfate HFA (PROVENTIL HFA) 108 (90 Base) MCG/ACT inhaler    PREVNAR 13 IM (Pneumococcal conjugate vaccine 13-valent)   5. Hypothyroidism, unspecified type  TSH without Reflex   6. Encounter for immunization   PREVNAR 13 IM (Pneumococcal conjugate vaccine 13-valent)   7. Chronic right-sided low back pain with right-sided sciatica  Rylee Cheng NP (Pain)   8. Encounter for long-term (current) use of medications  Drug Panel-PM-HI Res-UR Interp-A           Plan:      Misha Key was seen today for diabetes. Diagnoses and all orders for this visit:    Type 2 diabetes mellitus with chronic kidney disease, with long-term current use of insulin, unspecified CKD stage ([address] - We'll start patient on glyburide and Januvia. Patient unable to afford Humalog on a Humalog feels like it was approved by insurance. Patient's son does give Humalog injections patient will have some call with how much she is getting a day. Due to patient's kidney function unable to take metformin.  -     COMPREHENSIVE METABOLIC PANEL  -     SITagliptin (JANUVIA) 25 MG tablet; Take 1 tablet by mouth daily  -     glimepiride (AMARYL) 2 MG tablet; Take 1 tablet by mouth 2 times daily    Acute recurrent frontal sinusitis - suspect patient does have sinusitis we'll treat patient with Augmentin.   -

## 2018-09-11 LAB
6-ACETYLMORPHINE: NOT DETECTED
7-AMINOCLONAZEPAM: NOT DETECTED
ALPHA-OH-ALPRAZOLAM: NOT DETECTED
ALPRAZOLAM: NOT DETECTED
AMPHETAMINE: NOT DETECTED
BARBITURATES: NOT DETECTED
BENZOYLECGONINE: NOT DETECTED
BUPRENORPHINE: NOT DETECTED
CARISOPRODOL: NOT DETECTED
CLONAZEPAM: NOT DETECTED
CODEINE: NOT DETECTED
CREATININE URINE: 44.9 MG/DL (ref 20–400)
DIAZEPAM: NOT DETECTED
DRUGS EXPECTED: NORMAL
EER PAIN MGT DRUG PANEL, HIGH RES/EMIT U: NORMAL
ETHYL GLUCURONIDE: NOT DETECTED
FENTANYL: NOT DETECTED
HYDROCODONE: NOT DETECTED
HYDROMORPHONE: NOT DETECTED
LORAZEPAM: NOT DETECTED
MARIJUANA METABOLITE: NOT DETECTED
MDA: NOT DETECTED
MDEA: NOT DETECTED
MDMA URINE: NOT DETECTED
MEPERIDINE: NOT DETECTED
METHADONE: NOT DETECTED
METHAMPHETAMINE: NOT DETECTED
METHYLPHENIDATE: NOT DETECTED
MIDAZOLAM: NOT DETECTED
MORPHINE: NOT DETECTED
NORBUPRENORPHINE, FREE: NOT DETECTED
NORDIAZEPAM: NOT DETECTED
NORFENTANYL: NOT DETECTED
NORHYDROCODONE, URINE: NOT DETECTED
NOROXYCODONE: NOT DETECTED
NOROXYMORPHONE, URINE: NOT DETECTED
OXAZEPAM: NOT DETECTED
OXYCODONE: NOT DETECTED
OXYMORPHONE: NOT DETECTED
PAIN MANAGEMENT DRUG PANEL: NORMAL
PAIN MANAGEMENT DRUG PANEL: NORMAL
PCP: NOT DETECTED
PHENTERMINE: NOT DETECTED
PROPOXYPHENE: NOT DETECTED
TAPENTADOL, URINE: NOT DETECTED
TAPENTADOL-O-SULFATE, URINE: NOT DETECTED
TEMAZEPAM: NOT DETECTED
TRAMADOL: NOT DETECTED
ZOLPIDEM: NOT DETECTED

## 2018-09-13 ENCOUNTER — TELEPHONE (OUTPATIENT)
Dept: FAMILY MEDICINE CLINIC | Age: 68
End: 2018-09-13

## 2018-09-20 ENCOUNTER — TELEPHONE (OUTPATIENT)
Dept: FAMILY MEDICINE CLINIC | Age: 68
End: 2018-09-20

## 2018-09-20 DIAGNOSIS — E11.29 TYPE 2 DIABETES MELLITUS WITH MICROALBUMINURIA, WITH LONG-TERM CURRENT USE OF INSULIN (HCC): Primary | ICD-10-CM

## 2018-09-20 DIAGNOSIS — Z79.4 TYPE 2 DIABETES MELLITUS WITH MICROALBUMINURIA, WITH LONG-TERM CURRENT USE OF INSULIN (HCC): Primary | ICD-10-CM

## 2018-09-20 DIAGNOSIS — R80.9 TYPE 2 DIABETES MELLITUS WITH MICROALBUMINURIA, WITH LONG-TERM CURRENT USE OF INSULIN (HCC): Primary | ICD-10-CM

## 2018-09-20 NOTE — TELEPHONE ENCOUNTER
Reggie Confer with [facility] is calling to let Catee know that pt has no showed 2 apts and cancelled 1 apt with them.

## 2018-09-21 NOTE — TELEPHONE ENCOUNTER
Pt states she takes this 3 times a day, 1 in morning, 1 in the afternoon, and 1 at night. I changed the directions and the quantity to match this.

## 2018-09-21 NOTE — TELEPHONE ENCOUNTER
I guess the question is. . Is the pt currently taking insulin. She was not really sure at our last visit. If she is taking lantus Please give her a sample of a long acting insulin. I would like to add victoza to help her sugars. Going to see if the PA will go through.

## 2018-09-24 RX ORDER — GABAPENTIN 300 MG/1
CAPSULE ORAL
Qty: 270 CAPSULE | Refills: 0 | Status: ON HOLD | OUTPATIENT
Start: 2018-09-24 | End: 2019-01-04 | Stop reason: HOSPADM

## 2018-09-24 NOTE — TELEPHONE ENCOUNTER
Pt states that [facility] mail order sent her a refill in the mail and that is the one she has been taking. It was enough to get her until now.  She said she only had a couple days left

## 2018-09-27 ENCOUNTER — TELEPHONE (OUTPATIENT)
Dept: FAMILY MEDICINE CLINIC | Age: 68
End: 2018-09-27

## 2018-09-28 DIAGNOSIS — F41.9 ANXIETY: ICD-10-CM

## 2018-09-28 RX ORDER — TRAZODONE HYDROCHLORIDE 100 MG/1
200 TABLET ORAL NIGHTLY
Qty: 60 TABLET | Refills: 0 | Status: ON HOLD | OUTPATIENT
Start: 2018-09-28 | End: 2019-01-04 | Stop reason: HOSPADM

## 2018-09-28 RX ORDER — HYDRALAZINE HYDROCHLORIDE 25 MG/1
25 TABLET, FILM COATED ORAL EVERY 8 HOURS SCHEDULED
Qty: 90 TABLET | Refills: 1 | Status: ON HOLD | OUTPATIENT
Start: 2018-09-28 | End: 2019-01-04 | Stop reason: HOSPADM

## 2018-10-01 ENCOUNTER — TELEPHONE (OUTPATIENT)
Dept: FAMILY MEDICINE CLINIC | Age: 68
End: 2018-10-01

## 2018-10-02 ENCOUNTER — TELEPHONE (OUTPATIENT)
Dept: FAMILY MEDICINE CLINIC | Age: 68
End: 2018-10-02

## 2018-10-03 ENCOUNTER — TELEPHONE (OUTPATIENT)
Dept: FAMILY MEDICINE CLINIC | Age: 68
End: 2018-10-03

## 2018-10-03 ENCOUNTER — APPOINTMENT (OUTPATIENT)
Dept: CT IMAGING | Age: 68
End: 2018-10-03
Payer: MEDICARE

## 2018-10-03 ENCOUNTER — OFFICE VISIT (OUTPATIENT)
Dept: FAMILY MEDICINE CLINIC | Age: 68
End: 2018-10-03
Payer: MEDICARE

## 2018-10-03 ENCOUNTER — APPOINTMENT (OUTPATIENT)
Dept: GENERAL RADIOLOGY | Age: 68
End: 2018-10-03
Payer: MEDICARE

## 2018-10-03 ENCOUNTER — HOSPITAL ENCOUNTER (EMERGENCY)
Age: 68
Discharge: ANOTHER HOSPITAL | End: 2018-10-03
Attending: EMERGENCY MEDICINE
Payer: MEDICARE

## 2018-10-03 VITALS
BODY MASS INDEX: 25.58 KG/M2 | RESPIRATION RATE: 16 BRPM | WEIGHT: 139 LBS | OXYGEN SATURATION: 96 % | TEMPERATURE: 98.1 F | SYSTOLIC BLOOD PRESSURE: 137 MMHG | HEART RATE: 85 BPM | HEIGHT: 62 IN | DIASTOLIC BLOOD PRESSURE: 77 MMHG

## 2018-10-03 VITALS
DIASTOLIC BLOOD PRESSURE: 70 MMHG | BODY MASS INDEX: 26.26 KG/M2 | HEART RATE: 82 BPM | TEMPERATURE: 98.8 F | WEIGHT: 139 LBS | SYSTOLIC BLOOD PRESSURE: 150 MMHG

## 2018-10-03 DIAGNOSIS — J38.7 SUPRAGLOTTIC MASS: ICD-10-CM

## 2018-10-03 DIAGNOSIS — R09.02 HYPOXIC: ICD-10-CM

## 2018-10-03 DIAGNOSIS — R09.02 HYPOXIA: Primary | ICD-10-CM

## 2018-10-03 DIAGNOSIS — J44.1 CHRONIC OBSTRUCTIVE PULMONARY DISEASE WITH ACUTE EXACERBATION (HCC): Primary | ICD-10-CM

## 2018-10-03 DIAGNOSIS — J40 BRONCHITIS: ICD-10-CM

## 2018-10-03 LAB
A/G RATIO: 0.9 (ref 1.1–2.2)
ABO/RH: NORMAL
ALBUMIN SERPL-MCNC: 3 G/DL (ref 3.4–5)
ALP BLD-CCNC: 73 U/L (ref 40–129)
ALT SERPL-CCNC: 7 U/L (ref 10–40)
ANION GAP SERPL CALCULATED.3IONS-SCNC: 10 MMOL/L (ref 3–16)
ANTIBODY SCREEN: NORMAL
AST SERPL-CCNC: 14 U/L (ref 15–37)
BASOPHILS ABSOLUTE: 0.1 K/UL (ref 0–0.2)
BASOPHILS RELATIVE PERCENT: 0.7 %
BILIRUB SERPL-MCNC: <0.2 MG/DL (ref 0–1)
BUN BLDV-MCNC: 15 MG/DL (ref 7–20)
CALCIUM SERPL-MCNC: 8.5 MG/DL (ref 8.3–10.6)
CHLORIDE BLD-SCNC: 96 MMOL/L (ref 99–110)
CO2: 29 MMOL/L (ref 21–32)
CREAT SERPL-MCNC: 2 MG/DL (ref 0.6–1.2)
EOSINOPHILS ABSOLUTE: 0 K/UL (ref 0–0.6)
EOSINOPHILS RELATIVE PERCENT: 0.2 %
GFR AFRICAN AMERICAN: 30
GFR NON-AFRICAN AMERICAN: 25
GLOBULIN: 3.5 G/DL
GLUCOSE BLD-MCNC: 152 MG/DL (ref 70–99)
GLUCOSE BLD-MCNC: 55 MG/DL (ref 70–99)
GLUCOSE BLD-MCNC: 68 MG/DL (ref 70–99)
HCT VFR BLD CALC: 39.5 % (ref 36–48)
HEMOGLOBIN: 12.9 G/DL (ref 12–16)
LYMPHOCYTES ABSOLUTE: 2.1 K/UL (ref 1–5.1)
LYMPHOCYTES RELATIVE PERCENT: 21.5 %
MCH RBC QN AUTO: 25.4 PG (ref 26–34)
MCHC RBC AUTO-ENTMCNC: 32.6 G/DL (ref 31–36)
MCV RBC AUTO: 78.1 FL (ref 80–100)
MONOCYTES ABSOLUTE: 0.8 K/UL (ref 0–1.3)
MONOCYTES RELATIVE PERCENT: 8 %
NEUTROPHILS ABSOLUTE: 6.8 K/UL (ref 1.7–7.7)
NEUTROPHILS RELATIVE PERCENT: 69.6 %
PDW BLD-RTO: 17.1 % (ref 12.4–15.4)
PERFORMED ON: ABNORMAL
PERFORMED ON: ABNORMAL
PLATELET # BLD: 436 K/UL (ref 135–450)
PMV BLD AUTO: 7 FL (ref 5–10.5)
POTASSIUM SERPL-SCNC: 4.3 MMOL/L (ref 3.5–5.1)
RBC # BLD: 5.05 M/UL (ref 4–5.2)
SODIUM BLD-SCNC: 135 MMOL/L (ref 136–145)
TOTAL PROTEIN: 6.5 G/DL (ref 6.4–8.2)
TROPONIN: 0.02 NG/ML
WBC # BLD: 9.8 K/UL (ref 4–11)

## 2018-10-03 PROCEDURE — 80053 COMPREHEN METABOLIC PANEL: CPT

## 2018-10-03 PROCEDURE — 85025 COMPLETE CBC W/AUTO DIFF WBC: CPT

## 2018-10-03 PROCEDURE — 6360000002 HC RX W HCPCS: Performed by: EMERGENCY MEDICINE

## 2018-10-03 PROCEDURE — 2580000003 HC RX 258: Performed by: EMERGENCY MEDICINE

## 2018-10-03 PROCEDURE — 2700000000 HC OXYGEN THERAPY PER DAY

## 2018-10-03 PROCEDURE — 71046 X-RAY EXAM CHEST 2 VIEWS: CPT

## 2018-10-03 PROCEDURE — 94664 DEMO&/EVAL PT USE INHALER: CPT

## 2018-10-03 PROCEDURE — 99213 OFFICE O/P EST LOW 20 MIN: CPT | Performed by: NURSE PRACTITIONER

## 2018-10-03 PROCEDURE — 71250 CT THORAX DX C-: CPT

## 2018-10-03 PROCEDURE — 93005 ELECTROCARDIOGRAM TRACING: CPT | Performed by: EMERGENCY MEDICINE

## 2018-10-03 PROCEDURE — 99285 EMERGENCY DEPT VISIT HI MDM: CPT

## 2018-10-03 PROCEDURE — 86900 BLOOD TYPING SEROLOGIC ABO: CPT

## 2018-10-03 PROCEDURE — 96365 THER/PROPH/DIAG IV INF INIT: CPT

## 2018-10-03 PROCEDURE — 96375 TX/PRO/DX INJ NEW DRUG ADDON: CPT

## 2018-10-03 PROCEDURE — 86901 BLOOD TYPING SEROLOGIC RH(D): CPT

## 2018-10-03 PROCEDURE — 94640 AIRWAY INHALATION TREATMENT: CPT

## 2018-10-03 PROCEDURE — 86850 RBC ANTIBODY SCREEN: CPT

## 2018-10-03 PROCEDURE — 84484 ASSAY OF TROPONIN QUANT: CPT

## 2018-10-03 PROCEDURE — 96361 HYDRATE IV INFUSION ADD-ON: CPT

## 2018-10-03 PROCEDURE — 2500000003 HC RX 250 WO HCPCS: Performed by: EMERGENCY MEDICINE

## 2018-10-03 PROCEDURE — 87040 BLOOD CULTURE FOR BACTERIA: CPT

## 2018-10-03 PROCEDURE — 6370000000 HC RX 637 (ALT 250 FOR IP): Performed by: EMERGENCY MEDICINE

## 2018-10-03 PROCEDURE — 70490 CT SOFT TISSUE NECK W/O DYE: CPT

## 2018-10-03 RX ORDER — METHYLPREDNISOLONE SODIUM SUCCINATE 125 MG/2ML
125 INJECTION, POWDER, LYOPHILIZED, FOR SOLUTION INTRAMUSCULAR; INTRAVENOUS ONCE
Status: COMPLETED | OUTPATIENT
Start: 2018-10-03 | End: 2018-10-03

## 2018-10-03 RX ORDER — DEXTROSE MONOHYDRATE 25 G/50ML
25 INJECTION, SOLUTION INTRAVENOUS ONCE
Status: COMPLETED | OUTPATIENT
Start: 2018-10-03 | End: 2018-10-03

## 2018-10-03 RX ORDER — IPRATROPIUM BROMIDE AND ALBUTEROL SULFATE 2.5; .5 MG/3ML; MG/3ML
1 SOLUTION RESPIRATORY (INHALATION) ONCE
Status: COMPLETED | OUTPATIENT
Start: 2018-10-03 | End: 2018-10-03

## 2018-10-03 RX ADMIN — DEXTROSE MONOHYDRATE 25 G: 25 INJECTION, SOLUTION INTRAVENOUS at 20:52

## 2018-10-03 RX ADMIN — CLINDAMYCIN PHOSPHATE 600 MG: 150 INJECTION, SOLUTION, CONCENTRATE INTRAVENOUS at 21:34

## 2018-10-03 RX ADMIN — DEXTROSE AND SODIUM CHLORIDE: 5; 900 INJECTION, SOLUTION INTRAVENOUS at 21:35

## 2018-10-03 RX ADMIN — IPRATROPIUM BROMIDE AND ALBUTEROL SULFATE 1 AMPULE: .5; 3 SOLUTION RESPIRATORY (INHALATION) at 17:25

## 2018-10-03 RX ADMIN — METHYLPREDNISOLONE SODIUM SUCCINATE 125 MG: 125 INJECTION, POWDER, FOR SOLUTION INTRAMUSCULAR; INTRAVENOUS at 20:52

## 2018-10-03 ASSESSMENT — PAIN DESCRIPTION - FREQUENCY: FREQUENCY: CONTINUOUS

## 2018-10-03 ASSESSMENT — ENCOUNTER SYMPTOMS
COUGH: 1
SHORTNESS OF BREATH: 1
SINUS PRESSURE: 1
SORE THROAT: 1
SINUS PAIN: 1

## 2018-10-03 ASSESSMENT — PAIN SCALES - GENERAL: PAINLEVEL_OUTOF10: 10

## 2018-10-03 ASSESSMENT — PAIN DESCRIPTION - LOCATION: LOCATION: THROAT

## 2018-10-03 NOTE — ED PROVIDER NOTES
capsule, Take 1 capsule in the morning, 1 capsule in the afternoon, and 1 capsule at night., Disp: 270 capsule, Rfl: 0    levothyroxine (SYNTHROID) 175 MCG tablet, Take 1 tablet by mouth Daily, Disp: 90 tablet, Rfl: 0    ipratropium-albuterol (DUONEB) 0.5-2.5 (3) MG/3ML SOLN nebulizer solution, Inhale 3 mLs into the lungs every 4 hours as needed for Shortness of Breath, Disp: 360 mL, Rfl: 0    albuterol sulfate HFA (PROVENTIL HFA) 108 (90 Base) MCG/ACT inhaler, Inhale 2 puffs into the lungs every 4 hours as needed for Wheezing, Disp: 1 Inhaler, Rfl: 1    glimepiride (AMARYL) 2 MG tablet, Take 1 tablet by mouth 2 times daily, Disp: 30 tablet, Rfl: 3    lisinopril (PRINIVIL;ZESTRIL) 40 MG tablet, TAKE 1 TABLET EVERY DAY, Disp: 90 tablet, Rfl: 0    amLODIPine (NORVASC) 10 MG tablet, Take 1 tablet by mouth daily, Disp: 90 tablet, Rfl: 0    busPIRone (BUSPAR) 7.5 MG tablet, Take 1 tablet by mouth 3 times daily, Disp: 270 tablet, Rfl: 0    PARoxetine (PAXIL) 20 MG tablet, TAKE ONE TABLET BY MOUTH ONCE DAILY, Disp: 90 tablet, Rfl: 0    clopidogrel (PLAVIX) 75 MG tablet, Take 1 tablet by mouth daily, Disp: 90 tablet, Rfl: 0    insulin lispro (HUMALOG) 100 UNIT/ML injection vial, Inject 0-18 Units into the skin 3 times daily (with meals), Disp: 1 vial, Rfl: 0    INSULIN SYRINGE .5CC/29G (AIMSCO INSULIN SYR ULTRA THIN) 29G X 1/2\" 0.5 ML MISC, 1 each by Does not apply route 3 times daily, Disp: 100 each, Rfl: 3    insulin glargine (LANTUS) 100 UNIT/ML injection vial, Inject 30 Units into the skin nightly, Disp: 1 vial, Rfl: 0    carvedilol (COREG) 6.25 MG tablet, Take 2 tablets by mouth 2 times daily (with meals) TAKE ONE TABLET BY MOUTH TWICE DAILY, Disp: 120 tablet, Rfl: 0    Blood Glucose Monitoring Suppl (BLOOD GLUCOSE METER) KIT, Check glucose 4 times daily for diabetes. 250.02. ONE TOUCH ULTRA 2, Disp: 1 kit, Rfl: 0    aspirin 81 MG EC tablet, Take 1 tablet by mouth daily. , Disp: 30 tablet, Rfl: 1   inflammatory process or infectious process such is abscess,   but neoplastic etiology is of concern. CT CHEST WO CONTRAST   Final Result   No definite acute abnormality detected. No focal infiltrate is found. Secretions within the central airways, raising the possibility of bronchitis. XR CHEST STANDARD (2 VW)   Final Result   No evidence of acute cardiopulmonary disease. Xr Chest Standard (2 Vw)    Result Date: 10/3/2018  EXAMINATION: TWO VIEWS OF THE CHEST 10/3/2018 5:07 pm COMPARISON: May 11, 2018 HISTORY: ORDERING SYSTEM PROVIDED HISTORY: sob TECHNOLOGIST PROVIDED HISTORY: Reason for exam:->sob Ordering Physician Provided Reason for Exam: sob Acuity: Acute Type of Exam: Initial FINDINGS: No evidence of pneumonia, edema or other acute pulmonary process. No evidence of acute process of the cardiac or mediastinal structures. No evidence of pneumothorax or pleural effusion. Unchanged enlargement of the heart. No evidence of acute cardiopulmonary disease. Ct Soft Tissue Neck Wo Contrast    Result Date: 10/3/2018  EXAMINATION: CT OF THE NECK WITHOUT CONTRAST  10/3/2018 TECHNIQUE: CT of the neck was performed without the administration of intravenous contrast. Multiplanar reformatted images are provided for review. Dose modulation, iterative reconstruction, and/or weight based adjustment of the mA/kV was utilized to reduce the radiation dose to as low as reasonably achievable. COMPARISON: None. HISTORY: ORDERING SYSTEM PROVIDED HISTORY: SWELLING, NECK TECHNOLOGIST PROVIDED HISTORY: Ordering Physician Provided Reason for Exam: Swollen glands and ear pain FINDINGS: PHARYNX/LARYNX:  The palatine tonsils are normal in appearance. The tongue is normal in appearance. A supraglottic mass left side of approximately 3.3 x 2.5 x 3.1 cm is noted, slightly heterogeneous. No air bubbles diagnostic of abscess are noted, but lack of IV contrast limits assessment.   Neoplasia is not hoarseness. She continues to handle secretions well. Patient did start complaining of being shaky. Follow-up blood sugar was 55. Given the fact that she has a supraglottic mass she was kept nothing by mouth. The patient was given amp of D50 and D5 normal saline was hung. Medical decision making:  Patient presents emergency department with sore throat and cough. She has new oxygen requirement compared to baseline. She is minimally bronchospastic on exam however. Patient is chronically disease and therefore CTs were limited by lack of IV contrast.  Her hypoxia could be related to the mucous plugging and bronchitis 14 Feet. However she does have a large supraglottic mass unclear whether this represents abscess or true mass. She requires ENT consultation. Patient was given steroids and antibiotics. I spoke with Dr. Yuliana Jj, ENT at [facility]. He would like to send her to ED and scope her there and then admit. Discussed case with Dr Meena Calvo ED physician who accepts patient. We thoroughly discussed the history, physical exam, laboratory and imaging studies, as well as, current course of treatment within the emergency department. Based upon that discussion, we've decided to transfer Devika Moran to [facility], for further observation and evaluation of Devika Moran current condition. As I have deemed necessary from their history, physical, and studies, I have considered and evaluated Devika Moran for the following diagnoses:  Pneumonia, bronchitis, COPD exacerbation, throat mass, throat abscess, airway impingement. Clinical Impression:  1. Hypoxia    2. Bronchitis    3. Supraglottic mass        Dispo:  Patient will be transferred at this time. Patient was informed of this decision and agrees with plan. I have discussed lab and xray findings with patient and they understand. Questions were answered to the best of my ability.     Discharge vitals:  Blood pressure (!) 113/92, pulse 88, temperature 98.1 °F (36.7 °C), resp. rate 18, height 5' 2\" (1.575 m), weight 139 lb (63 kg), SpO2 99 %, not currently breastfeeding. Prescriptions given:   New Prescriptions    No medications on file         This chart was created using Dragon voice recognition software.        Franklin Bautista MD  10/03/18 3457

## 2018-10-03 NOTE — TELEPHONE ENCOUNTER
LYMPH NODES ARE REALLY SWOLLEN, IN PAIN, HOARSE, GOING ON FOR 5 DAYS GETTING WORSE. Wanting a dame day appt.  preferrably at [facility]

## 2018-10-04 LAB
EKG ATRIAL RATE: 86 BPM
EKG DIAGNOSIS: NORMAL
EKG P AXIS: 63 DEGREES
EKG P-R INTERVAL: 158 MS
EKG Q-T INTERVAL: 388 MS
EKG QRS DURATION: 100 MS
EKG QTC CALCULATION (BAZETT): 464 MS
EKG R AXIS: 83 DEGREES
EKG T AXIS: 59 DEGREES
EKG VENTRICULAR RATE: 86 BPM

## 2018-10-04 PROCEDURE — 93010 ELECTROCARDIOGRAM REPORT: CPT | Performed by: INTERNAL MEDICINE

## 2018-10-08 ENCOUNTER — TELEPHONE (OUTPATIENT)
Dept: FAMILY MEDICINE CLINIC | Age: 68
End: 2018-10-08

## 2018-10-09 ENCOUNTER — TELEPHONE (OUTPATIENT)
Dept: FAMILY MEDICINE CLINIC | Age: 68
End: 2018-10-09

## 2018-10-09 LAB
BLOOD CULTURE, ROUTINE: NORMAL
CULTURE, BLOOD 2: NORMAL

## 2018-10-09 NOTE — TELEPHONE ENCOUNTER
Eve Kohli is calling to let Rema know she resumed Nursing visits today after the patient's recent discharge from the hospital. Stated she has a F/u apt with Johanna Harrell on Thursday.

## 2018-10-11 ENCOUNTER — OFFICE VISIT (OUTPATIENT)
Dept: FAMILY MEDICINE CLINIC | Age: 68
End: 2018-10-11
Payer: MEDICARE

## 2018-10-11 VITALS
TEMPERATURE: 98.1 F | SYSTOLIC BLOOD PRESSURE: 124 MMHG | BODY MASS INDEX: 25.97 KG/M2 | OXYGEN SATURATION: 84 % | HEART RATE: 95 BPM | DIASTOLIC BLOOD PRESSURE: 78 MMHG | WEIGHT: 142 LBS

## 2018-10-11 DIAGNOSIS — J39.2 MASS OF THROAT: Primary | ICD-10-CM

## 2018-10-11 PROCEDURE — 99495 TRANSJ CARE MGMT MOD F2F 14D: CPT | Performed by: NURSE PRACTITIONER

## 2018-10-11 PROCEDURE — 1111F DSCHRG MED/CURRENT MED MERGE: CPT | Performed by: NURSE PRACTITIONER

## 2018-10-11 ASSESSMENT — ENCOUNTER SYMPTOMS
SHORTNESS OF BREATH: 0
WHEEZING: 0
SORE THROAT: 1
TROUBLE SWALLOWING: 1

## 2018-10-11 NOTE — PROGRESS NOTES
meals) 1 vial 0    INSULIN SYRINGE .5CC/29G (AIMSCO INSULIN SYR ULTRA THIN) 29G X 1/2\" 0.5 ML MISC 1 each by Does not apply route 3 times daily 100 each 3    carvedilol (COREG) 6.25 MG tablet Take 2 tablets by mouth 2 times daily (with meals) TAKE ONE TABLET BY MOUTH TWICE DAILY 120 tablet 0    Blood Glucose Monitoring Suppl (BLOOD GLUCOSE METER) KIT Check glucose 4 times daily for diabetes. 250.02. ONE TOUCH ULTRA 2 1 kit 0    aspirin 81 MG EC tablet Take 1 tablet by mouth daily. 30 tablet 1        Medications patient taking as of now reconciled against medications ordered at time of hospital discharge: Yes    Chief Complaint   Patient presents with    Follow-Up from [facility] Song is a 81 yo female who is here for follow up on hospital admission- possible throat cancer. Throat is sore- she states she has difficulty swallowing- has not heard back about biopsy of her throat yet. Not wearing 02 today. States 02 Mostly drops when she is walking. Coughing up blood off and on but not today. States she wears 02 2L at home- does not like to bring 02 with her. States her anxiety is getting worse- told her she can increase her buspar to taking 2 of those TID if needed and to discuss with Catee about additional medication for anxiety if needed. Patient still smoking 2 packs a day. Inpatient course: Discharge summary reviewed- see chart. Interval history/Current status:     Review of Systems   Constitutional: Negative for chills and fever. HENT: Positive for ear pain (bilateral), sore throat and trouble swallowing. Respiratory: Negative for shortness of breath and wheezing. Cardiovascular: Negative for chest pain and palpitations. Neurological: Positive for headaches. Vitals:    10/11/18 1431   BP: 124/78   Pulse: 95   Temp: 98.1 °F (36.7 °C)   SpO2: (!) 84%   Weight: 142 lb (64.4 kg)     Body mass index is 25.97 kg/m².    Wt Readings from Last 3 Encounters:   10/11/18 142 lb (64.4 kg) 10/03/18 139 lb (63 kg)   10/03/18 139 lb (63 kg)     BP Readings from Last 3 Encounters:   10/11/18 124/78   10/03/18 137/77   10/03/18 (!) 150/70       Physical Exam   Constitutional: She is oriented to person, place, and time. She appears well-developed and well-nourished. HENT:   Head: Normocephalic and atraumatic. Right Ear: Tympanic membrane and external ear normal.   Left Ear: Tympanic membrane and external ear normal.   Nose: Nose normal.   Mouth/Throat: Oropharynx is clear and moist. No oropharyngeal exudate, posterior oropharyngeal edema or posterior oropharyngeal erythema. Eyes: Conjunctivae are normal.   Neck: Normal range of motion. Neck supple. Cardiovascular: Normal rate, regular rhythm and normal heart sounds. No murmur heard. Pulmonary/Chest: Effort normal and breath sounds normal. No respiratory distress. She has no wheezes. She has no rales. Musculoskeletal: Normal range of motion. Lymphadenopathy:     She has no cervical adenopathy. Neurological: She is alert and oriented to person, place, and time. Skin: Skin is warm and dry. Psychiatric: She has a normal mood and affect. Her behavior is normal. Judgment and thought content normal.   Nursing note and vitals reviewed. Assessment/Plan:  1.  Mass of throat    - [place] DISCHARGE MEDS RECONCILED W/ CURRENT OUTPATIENT MED LIST      Number given for the doctor at [facility] Dr. Naren Sierra for follow up on biopsy    Medical Decision Making: moderate complexity

## 2018-10-12 ENCOUNTER — HOSPITAL ENCOUNTER (EMERGENCY)
Age: 68
Discharge: HOME | DRG: 177 | End: 2018-10-12
Attending: EMERGENCY MEDICINE
Payer: MEDICARE

## 2018-10-12 ENCOUNTER — APPOINTMENT (OUTPATIENT)
Dept: GENERAL RADIOLOGY | Age: 68
DRG: 177 | End: 2018-10-12
Payer: MEDICARE

## 2018-10-12 ENCOUNTER — TELEPHONE (OUTPATIENT)
Dept: FAMILY MEDICINE CLINIC | Age: 68
End: 2018-10-12

## 2018-10-12 ENCOUNTER — HOSPITAL ENCOUNTER (INPATIENT)
Age: 68
LOS: 10 days | Discharge: NURSING, REHAB OR LONG-TERM CARE | DRG: 177 | End: 2018-10-22
Attending: EMERGENCY MEDICINE | Admitting: INTERNAL MEDICINE
Payer: MEDICARE

## 2018-10-12 VITALS
TEMPERATURE: 97.7 F | BODY MASS INDEX: 25.97 KG/M2 | DIASTOLIC BLOOD PRESSURE: 73 MMHG | RESPIRATION RATE: 14 BRPM | HEART RATE: 96 BPM | SYSTOLIC BLOOD PRESSURE: 136 MMHG | OXYGEN SATURATION: 97 % | WEIGHT: 142 LBS

## 2018-10-12 DIAGNOSIS — D3A.8: ICD-10-CM

## 2018-10-12 DIAGNOSIS — J18.9 PNEUMONIA OF LEFT LOWER LOBE DUE TO INFECTIOUS ORGANISM: Primary | ICD-10-CM

## 2018-10-12 DIAGNOSIS — J18.9 PNEUMONIA OF LEFT LOWER LOBE DUE TO INFECTIOUS ORGANISM: ICD-10-CM

## 2018-10-12 DIAGNOSIS — R06.02 SHORTNESS OF BREATH: ICD-10-CM

## 2018-10-12 DIAGNOSIS — R09.02 HYPOXIA: Primary | ICD-10-CM

## 2018-10-12 PROBLEM — J96.21 ACUTE ON CHRONIC RESPIRATORY FAILURE WITH HYPOXIA (HCC): Status: ACTIVE | Noted: 2018-10-12

## 2018-10-12 LAB
A/G RATIO: 0.8 (ref 1.1–2.2)
ABO/RH: NORMAL
ABO/RH: NORMAL
ALBUMIN SERPL-MCNC: 2.6 G/DL (ref 3.4–5)
ALP BLD-CCNC: 61 U/L (ref 40–129)
ALT SERPL-CCNC: 6 U/L (ref 10–40)
ANION GAP SERPL CALCULATED.3IONS-SCNC: 9 MMOL/L (ref 3–16)
ANTIBODY SCREEN: NORMAL
ANTIBODY SCREEN: NORMAL
AST SERPL-CCNC: 10 U/L (ref 15–37)
BASOPHILS ABSOLUTE: 0.1 K/UL (ref 0–0.2)
BASOPHILS RELATIVE PERCENT: 0.6 %
BILIRUB SERPL-MCNC: <0.2 MG/DL (ref 0–1)
BUN BLDV-MCNC: 17 MG/DL (ref 7–20)
CALCIUM SERPL-MCNC: 8.3 MG/DL (ref 8.3–10.6)
CHLORIDE BLD-SCNC: 97 MMOL/L (ref 99–110)
CO2: 29 MMOL/L (ref 21–32)
CREAT SERPL-MCNC: 1.7 MG/DL (ref 0.6–1.2)
EKG ATRIAL RATE: 96 BPM
EKG DIAGNOSIS: NORMAL
EKG P AXIS: 69 DEGREES
EKG P-R INTERVAL: 198 MS
EKG Q-T INTERVAL: 358 MS
EKG QRS DURATION: 96 MS
EKG QTC CALCULATION (BAZETT): 452 MS
EKG R AXIS: 87 DEGREES
EKG T AXIS: 54 DEGREES
EKG VENTRICULAR RATE: 96 BPM
EOSINOPHILS ABSOLUTE: 0 K/UL (ref 0–0.6)
EOSINOPHILS RELATIVE PERCENT: 0.1 %
GFR AFRICAN AMERICAN: 36
GFR NON-AFRICAN AMERICAN: 30
GLOBULIN: 3.2 G/DL
GLUCOSE BLD-MCNC: 142 MG/DL (ref 70–99)
GLUCOSE BLD-MCNC: 353 MG/DL (ref 70–99)
GLUCOSE BLD-MCNC: 459 MG/DL (ref 70–99)
GLUCOSE BLD-MCNC: 511 MG/DL (ref 70–99)
GLUCOSE BLD-MCNC: 511 MG/DL (ref 70–99)
GLUCOSE BLD-MCNC: 526 MG/DL (ref 70–99)
HCT VFR BLD CALC: 34.3 % (ref 36–48)
HEMOGLOBIN: 11 G/DL (ref 12–16)
LACTIC ACID: 0.6 MMOL/L (ref 0.4–2)
LACTIC ACID: 0.7 MMOL/L (ref 0.4–2)
LYMPHOCYTES ABSOLUTE: 1.2 K/UL (ref 1–5.1)
LYMPHOCYTES RELATIVE PERCENT: 12.2 %
MCH RBC QN AUTO: 25.3 PG (ref 26–34)
MCHC RBC AUTO-ENTMCNC: 32.2 G/DL (ref 31–36)
MCV RBC AUTO: 78.7 FL (ref 80–100)
MONOCYTES ABSOLUTE: 0.7 K/UL (ref 0–1.3)
MONOCYTES RELATIVE PERCENT: 7.2 %
NEUTROPHILS ABSOLUTE: 7.6 K/UL (ref 1.7–7.7)
NEUTROPHILS RELATIVE PERCENT: 79.9 %
PDW BLD-RTO: 17.4 % (ref 12.4–15.4)
PERFORMED ON: ABNORMAL
PLATELET # BLD: 307 K/UL (ref 135–450)
PMV BLD AUTO: 7 FL (ref 5–10.5)
POTASSIUM SERPL-SCNC: 3.8 MMOL/L (ref 3.5–5.1)
PRO-BNP: 1978 PG/ML (ref 0–124)
RBC # BLD: 4.36 M/UL (ref 4–5.2)
SODIUM BLD-SCNC: 135 MMOL/L (ref 136–145)
TOTAL PROTEIN: 5.8 G/DL (ref 6.4–8.2)
TROPONIN: 0.02 NG/ML
TROPONIN: <0.01 NG/ML
WBC # BLD: 9.5 K/UL (ref 4–11)

## 2018-10-12 PROCEDURE — 86901 BLOOD TYPING SEROLOGIC RH(D): CPT

## 2018-10-12 PROCEDURE — 87070 CULTURE OTHR SPECIMN AEROBIC: CPT

## 2018-10-12 PROCEDURE — 6360000002 HC RX W HCPCS: Performed by: INTERNAL MEDICINE

## 2018-10-12 PROCEDURE — 83550 IRON BINDING TEST: CPT

## 2018-10-12 PROCEDURE — 87040 BLOOD CULTURE FOR BACTERIA: CPT

## 2018-10-12 PROCEDURE — 94640 AIRWAY INHALATION TREATMENT: CPT

## 2018-10-12 PROCEDURE — 6370000000 HC RX 637 (ALT 250 FOR IP): Performed by: INTERNAL MEDICINE

## 2018-10-12 PROCEDURE — 83605 ASSAY OF LACTIC ACID: CPT

## 2018-10-12 PROCEDURE — 94761 N-INVAS EAR/PLS OXIMETRY MLT: CPT

## 2018-10-12 PROCEDURE — C9113 INJ PANTOPRAZOLE SODIUM, VIA: HCPCS | Performed by: INTERNAL MEDICINE

## 2018-10-12 PROCEDURE — 94664 DEMO&/EVAL PT USE INHALER: CPT

## 2018-10-12 PROCEDURE — 84484 ASSAY OF TROPONIN QUANT: CPT

## 2018-10-12 PROCEDURE — 86900 BLOOD TYPING SEROLOGIC ABO: CPT

## 2018-10-12 PROCEDURE — 99285 EMERGENCY DEPT VISIT HI MDM: CPT

## 2018-10-12 PROCEDURE — 1200000000 HC SEMI PRIVATE

## 2018-10-12 PROCEDURE — 82947 ASSAY GLUCOSE BLOOD QUANT: CPT

## 2018-10-12 PROCEDURE — 83880 ASSAY OF NATRIURETIC PEPTIDE: CPT

## 2018-10-12 PROCEDURE — 93005 ELECTROCARDIOGRAM TRACING: CPT | Performed by: EMERGENCY MEDICINE

## 2018-10-12 PROCEDURE — 86850 RBC ANTIBODY SCREEN: CPT

## 2018-10-12 PROCEDURE — 85025 COMPLETE CBC W/AUTO DIFF WBC: CPT

## 2018-10-12 PROCEDURE — 36415 COLL VENOUS BLD VENIPUNCTURE: CPT

## 2018-10-12 PROCEDURE — 71046 X-RAY EXAM CHEST 2 VIEWS: CPT

## 2018-10-12 PROCEDURE — 80053 COMPREHEN METABOLIC PANEL: CPT

## 2018-10-12 PROCEDURE — 99284 EMERGENCY DEPT VISIT MOD MDM: CPT

## 2018-10-12 PROCEDURE — 6370000000 HC RX 637 (ALT 250 FOR IP): Performed by: NURSE PRACTITIONER

## 2018-10-12 PROCEDURE — 2580000003 HC RX 258: Performed by: EMERGENCY MEDICINE

## 2018-10-12 PROCEDURE — 6360000002 HC RX W HCPCS: Performed by: EMERGENCY MEDICINE

## 2018-10-12 PROCEDURE — 93010 ELECTROCARDIOGRAM REPORT: CPT | Performed by: INTERNAL MEDICINE

## 2018-10-12 PROCEDURE — 2700000000 HC OXYGEN THERAPY PER DAY

## 2018-10-12 PROCEDURE — 82728 ASSAY OF FERRITIN: CPT

## 2018-10-12 PROCEDURE — 83540 ASSAY OF IRON: CPT

## 2018-10-12 PROCEDURE — 87205 SMEAR GRAM STAIN: CPT

## 2018-10-12 RX ORDER — FUROSEMIDE 10 MG/ML
20 INJECTION INTRAMUSCULAR; INTRAVENOUS ONCE
Status: COMPLETED | OUTPATIENT
Start: 2018-10-12 | End: 2018-10-12

## 2018-10-12 RX ORDER — ALBUTEROL SULFATE 90 UG/1
2 AEROSOL, METERED RESPIRATORY (INHALATION) EVERY 4 HOURS PRN
Status: DISCONTINUED | OUTPATIENT
Start: 2018-10-12 | End: 2018-10-22 | Stop reason: HOSPADM

## 2018-10-12 RX ORDER — DEXTROSE MONOHYDRATE 50 MG/ML
100 INJECTION, SOLUTION INTRAVENOUS PRN
Status: DISCONTINUED | OUTPATIENT
Start: 2018-10-12 | End: 2018-10-22 | Stop reason: HOSPADM

## 2018-10-12 RX ORDER — LISINOPRIL 20 MG/1
40 TABLET ORAL DAILY
Status: DISCONTINUED | OUTPATIENT
Start: 2018-10-12 | End: 2018-10-22 | Stop reason: HOSPADM

## 2018-10-12 RX ORDER — AZITHROMYCIN 250 MG/1
TABLET, FILM COATED ORAL
Qty: 1 PACKET | Refills: 0 | Status: ON HOLD | OUTPATIENT
Start: 2018-10-12 | End: 2018-10-19 | Stop reason: HOSPADM

## 2018-10-12 RX ORDER — DEXTROSE MONOHYDRATE 25 G/50ML
12.5 INJECTION, SOLUTION INTRAVENOUS PRN
Status: DISCONTINUED | OUTPATIENT
Start: 2018-10-12 | End: 2018-10-22 | Stop reason: HOSPADM

## 2018-10-12 RX ORDER — CLOPIDOGREL BISULFATE 75 MG/1
75 TABLET ORAL DAILY
Status: DISCONTINUED | OUTPATIENT
Start: 2018-10-12 | End: 2018-10-12

## 2018-10-12 RX ORDER — INSULIN GLARGINE 100 [IU]/ML
30 INJECTION, SOLUTION SUBCUTANEOUS NIGHTLY
Status: DISCONTINUED | OUTPATIENT
Start: 2018-10-12 | End: 2018-10-13

## 2018-10-12 RX ORDER — PANTOPRAZOLE SODIUM 40 MG/10ML
40 INJECTION, POWDER, LYOPHILIZED, FOR SOLUTION INTRAVENOUS 2 TIMES DAILY
Status: DISCONTINUED | OUTPATIENT
Start: 2018-10-12 | End: 2018-10-22 | Stop reason: HOSPADM

## 2018-10-12 RX ORDER — PAROXETINE HYDROCHLORIDE 20 MG/1
20 TABLET, FILM COATED ORAL DAILY
Status: DISCONTINUED | OUTPATIENT
Start: 2018-10-12 | End: 2018-10-22 | Stop reason: HOSPADM

## 2018-10-12 RX ORDER — ASPIRIN 81 MG/1
81 TABLET ORAL DAILY
Status: DISCONTINUED | OUTPATIENT
Start: 2018-10-12 | End: 2018-10-12

## 2018-10-12 RX ORDER — NICOTINE POLACRILEX 4 MG
15 LOZENGE BUCCAL PRN
Status: DISCONTINUED | OUTPATIENT
Start: 2018-10-12 | End: 2018-10-22 | Stop reason: HOSPADM

## 2018-10-12 RX ORDER — OXYCODONE HYDROCHLORIDE 5 MG/1
5 TABLET ORAL 2 TIMES DAILY PRN
Status: DISCONTINUED | OUTPATIENT
Start: 2018-10-12 | End: 2018-10-22 | Stop reason: HOSPADM

## 2018-10-12 RX ORDER — BENZONATATE 100 MG/1
100 CAPSULE ORAL 3 TIMES DAILY PRN
Qty: 21 CAPSULE | Refills: 0 | Status: SHIPPED | OUTPATIENT
Start: 2018-10-12 | End: 2018-10-19

## 2018-10-12 RX ORDER — IPRATROPIUM BROMIDE AND ALBUTEROL SULFATE 2.5; .5 MG/3ML; MG/3ML
3 SOLUTION RESPIRATORY (INHALATION) EVERY 4 HOURS PRN
Status: DISCONTINUED | OUTPATIENT
Start: 2018-10-12 | End: 2018-10-12

## 2018-10-12 RX ORDER — SODIUM CHLORIDE 9 MG/ML
INJECTION, SOLUTION INTRAVENOUS
Status: DISPENSED
Start: 2018-10-12 | End: 2018-10-13

## 2018-10-12 RX ORDER — IPRATROPIUM BROMIDE AND ALBUTEROL SULFATE 2.5; .5 MG/3ML; MG/3ML
3 SOLUTION RESPIRATORY (INHALATION)
Status: DISCONTINUED | OUTPATIENT
Start: 2018-10-12 | End: 2018-10-15

## 2018-10-12 RX ORDER — BENZONATATE 100 MG/1
100 CAPSULE ORAL 3 TIMES DAILY PRN
Status: DISCONTINUED | OUTPATIENT
Start: 2018-10-12 | End: 2018-10-22 | Stop reason: HOSPADM

## 2018-10-12 RX ORDER — FUROSEMIDE 10 MG/ML
INJECTION INTRAMUSCULAR; INTRAVENOUS
Status: DISPENSED
Start: 2018-10-12 | End: 2018-10-13

## 2018-10-12 RX ORDER — ATORVASTATIN CALCIUM 10 MG/1
10 TABLET, FILM COATED ORAL DAILY
Status: DISCONTINUED | OUTPATIENT
Start: 2018-10-12 | End: 2018-10-22 | Stop reason: HOSPADM

## 2018-10-12 RX ORDER — CARVEDILOL 6.25 MG/1
12.5 TABLET ORAL 2 TIMES DAILY WITH MEALS
Status: DISCONTINUED | OUTPATIENT
Start: 2018-10-12 | End: 2018-10-22 | Stop reason: HOSPADM

## 2018-10-12 RX ADMIN — AZITHROMYCIN MONOHYDRATE 500 MG: 500 INJECTION, POWDER, LYOPHILIZED, FOR SOLUTION INTRAVENOUS at 19:35

## 2018-10-12 RX ADMIN — CEFTRIAXONE SODIUM 1 G: 1 INJECTION, POWDER, FOR SOLUTION INTRAMUSCULAR; INTRAVENOUS at 17:55

## 2018-10-12 RX ADMIN — INSULIN GLARGINE 30 UNITS: 100 INJECTION, SOLUTION SUBCUTANEOUS at 23:40

## 2018-10-12 RX ADMIN — CARVEDILOL 12.5 MG: 6.25 TABLET, FILM COATED ORAL at 21:23

## 2018-10-12 RX ADMIN — FUROSEMIDE 20 MG: 10 INJECTION, SOLUTION INTRAMUSCULAR; INTRAVENOUS at 19:35

## 2018-10-12 RX ADMIN — IPRATROPIUM BROMIDE AND ALBUTEROL SULFATE 3 ML: .5; 3 SOLUTION RESPIRATORY (INHALATION) at 17:58

## 2018-10-12 RX ADMIN — INSULIN LISPRO 9 UNITS: 100 INJECTION, SOLUTION INTRAVENOUS; SUBCUTANEOUS at 21:13

## 2018-10-12 RX ADMIN — PANTOPRAZOLE SODIUM 40 MG: 40 INJECTION, POWDER, FOR SOLUTION INTRAVENOUS at 21:25

## 2018-10-12 RX ADMIN — INSULIN HUMAN 10 UNITS: 100 INJECTION, SOLUTION PARENTERAL at 22:20

## 2018-10-12 NOTE — ED PROVIDER NOTES
DAY 8/9/18   [facility], APRN - CNP   amLODIPine (NORVASC) 10 MG tablet Take 1 tablet by mouth daily 8/9/18   [facility], SHELBY Haque CNP   atorvastatin (LIPITOR) 10 MG tablet Take 1 tablet by mouth daily 8/9/18   [facility], APRN - CNP   busPIRone (BUSPAR) 7.5 MG tablet Take 1 tablet by mouth 3 times daily 8/9/18   [facility], APRN - CNP   PARoxetine (PAXIL) 20 MG tablet TAKE ONE TABLET BY MOUTH ONCE DAILY 8/9/18   [facility], APRN - CNP   clopidogrel (PLAVIX) 75 MG tablet Take 1 tablet by mouth daily 8/9/18   [facility], APRN - CNP   insulin lispro (HUMALOG) 100 UNIT/ML injection vial Inject 0-18 Units into the skin 3 times daily (with meals) 8/8/18   SHELBY Lr CNP   INSULIN SYRINGE .5CC/29G (AIMSCO INSULIN SYR ULTRA THIN) 29G X 1/2\" 0.5 ML MISC 1 each by Does not apply route 3 times daily 8/8/18   SHELBY Lr CNP   insulin glargine (LANTUS) 100 UNIT/ML injection vial Inject 30 Units into the skin nightly 5/14/18   SHELBY Bethea CNP   carvedilol (COREG) 6.25 MG tablet Take 2 tablets by mouth 2 times daily (with meals) TAKE ONE TABLET BY MOUTH TWICE DAILY 5/4/18   Vic Fuller MD   Blood Glucose Monitoring Suppl (BLOOD GLUCOSE METER) KIT Check glucose 4 times daily for diabetes. 250.02. ONE TOUCH ULTRA 2 2/4/15   Betty Cerna MD   aspirin 81 MG EC tablet Take 1 tablet by mouth daily. 7/8/13   Roderick Ashford MD       Social history:  reports that she has been smoking Cigarettes. She has been smoking about 2.00 packs per day. She has never used smokeless tobacco. She reports that she does not drink alcohol or use drugs.     Family history:    Family History   Problem Relation Age of Onset    Heart Attack Mother     Heart Failure Mother     Cancer Father         lung    Cancer Brother     Diabetes Sister        Exam  ED Triage Vitals [10/12/18 1441]   BP Temp Temp Source Pulse Resp SpO2 Height Weight   113/82 96.9 °F (36.1

## 2018-10-12 NOTE — ED PROVIDER NOTES
IPSS Questionnaire (AUA-7): Over the past month    1)  How often have you had a sensation of not emptying your bladder completely after you finish urinating? 3 - About half the time   2)  How often have you had to urinate again less than two hours after you finished urinating? 2 - Less than half the time   3)  How often have you found you stopped and started again several times when you urinated? 2 - Less than half the time   4) How difficult have you found it to postpone urination? 0 - Not at all   5) How often have you had a weak urinary stream?  4 - More than half the time   6) How often have you had to push or strain to begin urination?   1 - Less than 1 time in 5   7) How many times did you most typically get up to urinate from the time you went to bed until the time you got up in the morning?  0 - None   Total Score:  12 respiratory distress. She has decreased breath sounds in the right lower field and the left lower field. She has no wheezes. She has rhonchi in the right lower field and the left lower field. Abdominal: Soft. She exhibits no distension. There is no tenderness. There is no rebound and no guarding. Musculoskeletal: She exhibits no edema or deformity. Neurological: She is alert and oriented to person, place, and time. She has normal strength. She displays no tremor. She exhibits normal muscle tone. No facial droop, no slurred speech   Skin: Skin is warm and dry. No rash noted. She is not diaphoretic. No cyanosis. No pallor. Psychiatric: She has a normal mood and affect. Her speech is normal.   Nursing note and vitals reviewed. MDM/ED Course  Patient arrived hypoxic. She was given 2 L oxygen via nasal cannula and O2 sat improved to 97%    EKG  The Ekg interpreted by me in the absence of a cardiologist shows. normal sinus rhythm with a rate of 96  Axis is   Normal  QTc is  normal  Intervals and Durations are unremarkable. No specific ST-T wave changes appreciated. No evidence of acute ischemia. No significant change from prior EKG dated 6/14/18      Radiology  Xr Chest Standard (2 Vw)    Result Date: 10/12/2018  EXAMINATION: TWO VIEWS OF THE CHEST 10/12/2018 8:00 am COMPARISON: 10/03/2018 HISTORY: ORDERING SYSTEM PROVIDED HISTORY: shortness of breath TECHNOLOGIST PROVIDED HISTORY: Reason for exam:->shortness of breath Ordering Physician Provided Reason for Exam: SOB Acuity: Acute Type of Exam: Initial FINDINGS: Telemetry leads overlie the chest.  Cardiac and mediastinal contours are unchanged from previous examination with atherosclerotic calcification of the thoracic aorta. There is mild indistinctness of the central vasculature. There is a left pleural effusion with associated left basilar airspace disease. No pneumothorax.      Left pleural effusion and associated basilar airspace disease,

## 2018-10-12 NOTE — PROGRESS NOTES
1    Breath Sounds: Absent bilaterally and/or with wheezes = 3    Sputum  Sputum Color: Dark red, Tenacity: Thick, Sputum How Obtained: Spontaneous cough  Cough: Strong, spontaneous, non-productive = 0    Vital Signs   /82   Pulse 98   Temp 98.3 °F (36.8 °C) (Oral)   Resp 16   Wt 142 lb (64.4 kg)   LMP  (LMP Unknown)   SpO2 90%   BMI 25.97 kg/m²   SPO2 (COPD values may differ): 86-87% on room air or greater than 92% on FiO2 35- 50% = 3    Peak Flow (asthma only): not applicable = 0    RSI: 74-60 = Q6H or QID and Q4HPRN for dyspnea        Plan       Goals: medication delivery and improve oxygenation    Patient/caregiver was educated on the proper method of use for Respiratory Care Devices:  Yes      Level of patient/caregiver understanding able to:   [] Verbalize understanding   [] Demonstrate understanding       [] Teach back        [] Needs reinforcement       []  No available caregiver               []  Other:     Response to education:  Good     Is patient being placed on Home Treatment Regimen? No     Does the patient have everything they need prior to discharge? NA     Comments: chart reviewed and pt assessed    Plan of Care: Duoneb txs Q4H w/a and Albuterol mdi prn    Electronically signed by Reginaldo Mckeon RCP on 10/12/2018 at 6:37 PM    Respiratory Protocol Guidelines     1. Assessment and treatment by Respiratory Therapy will be initiated for medication and therapeutic interventions upon initiation of aerosolized medication. 2. Physician will be contacted for respiratory rate (RR) greater than 35 breaths per minute. Therapy will be held for heart rate (HR) greater than 140 beats per minute, pending direction from physician. 3. Bronchodilators will be administered via Metered Dose Inhaler (MDI) with spacer when the following criteria are met:  a. Alert and cooperative     b. HR < 140 bpm  c. RR < 30 bpm                d. Can demonstrate a 2-3 second inspiratory hold  4.  Bronchodilators will be administered via Hand Held Nebulizer ANJALI [facility]) to patients when ANY of the following criteria are met  a. Incognizant or uncooperative          b. Patients treated with HHN at Home        c. Unable to demonstrate proper use of MDI with spacer     d. RR > 30 bpm   5. Bronchodilators will be delivered via Metered Dose Inhaler (MDI), HHN, Aerogen to intubated patients on mechanical ventilation. 6. Inhalation medication orders will be delivered and/or substituted as outlined below. Aerosolized Medications Ordering and Administration Guidelines:    1. All Medications will be ordered by a physician, and their frequency and/or modality will be adjusted as defined by the patients Respiratory Severity Index (RSI) score. 2. If the patient does not have documented COPD, consider discontinuing anticholinergics when RSI is less than 9.  3. If the bronchospasm worsens (increased RSI), then the bronchodilator frequency can be increased to a maximum of every 4 hours. If greater than every 4 hours is required, the physician will be contacted. 4. If the bronchospasm improves, the frequency of the bronchodilator can be decreased, based on the patient's RSI, but not less than home treatment regimen frequency. 5. Bronchodilator(s) will be discontinued if patient has a RSI less than 9 and has received no scheduled or as needed treatment for 72  Hrs. Patients Ordered on a Mucolytic Agent:    1. Must always be administered with a bronchodilator. 2. Discontinue if patient experiences worsened bronchospasm, or secretions have lessened to the point that the patient is able to clear them with a cough. Anti-inflammatory and Combination Medications:    1. If the patient lacks prior history of lung disease, is not using inhaled anti-inflammatory medication at home, and lacks wheezing by examination or by history for at least 24 hours, contact physician for possible discontinuation.

## 2018-10-13 PROBLEM — R04.2 HEMOPTYSIS: Status: ACTIVE | Noted: 2018-10-13

## 2018-10-13 PROBLEM — D3A.8: Status: ACTIVE | Noted: 2018-10-13

## 2018-10-13 PROBLEM — J96.11 CHRONIC RESPIRATORY FAILURE WITH HYPOXIA AND HYPERCAPNIA (HCC): Status: ACTIVE | Noted: 2018-10-13

## 2018-10-13 PROBLEM — J96.12 CHRONIC RESPIRATORY FAILURE WITH HYPOXIA AND HYPERCAPNIA (HCC): Status: ACTIVE | Noted: 2018-10-13

## 2018-10-13 PROBLEM — D50.0 ANEMIA DUE TO BLOOD LOSS: Status: ACTIVE | Noted: 2018-10-13

## 2018-10-13 LAB
ANION GAP SERPL CALCULATED.3IONS-SCNC: 8 MMOL/L (ref 3–16)
BASE EXCESS ARTERIAL: 3.2 MMOL/L (ref -3–3)
BASOPHILS ABSOLUTE: 0.1 K/UL (ref 0–0.2)
BASOPHILS RELATIVE PERCENT: 0.6 %
BUN BLDV-MCNC: 59 MG/DL (ref 7–20)
CALCIUM SERPL-MCNC: 8 MG/DL (ref 8.3–10.6)
CARBOXYHEMOGLOBIN ARTERIAL: 0.1 % (ref 0–1.5)
CHLORIDE BLD-SCNC: 99 MMOL/L (ref 99–110)
CO2: 28 MMOL/L (ref 21–32)
CREAT SERPL-MCNC: 1.8 MG/DL (ref 0.6–1.2)
EOSINOPHILS ABSOLUTE: 0 K/UL (ref 0–0.6)
EOSINOPHILS RELATIVE PERCENT: 0 %
FERRITIN: 44.8 NG/ML (ref 15–150)
GFR AFRICAN AMERICAN: 34
GFR NON-AFRICAN AMERICAN: 28
GLUCOSE BLD-MCNC: 100 MG/DL (ref 70–99)
GLUCOSE BLD-MCNC: 104 MG/DL (ref 70–99)
GLUCOSE BLD-MCNC: 118 MG/DL (ref 70–99)
GLUCOSE BLD-MCNC: 297 MG/DL (ref 70–99)
GLUCOSE BLD-MCNC: 69 MG/DL (ref 70–99)
GLUCOSE BLD-MCNC: 83 MG/DL (ref 70–99)
GLUCOSE BLD-MCNC: 93 MG/DL (ref 70–99)
HCO3 ARTERIAL: 28.7 MMOL/L (ref 21–29)
HCT VFR BLD CALC: 24.2 % (ref 36–48)
HCT VFR BLD CALC: 24.5 % (ref 36–48)
HCT VFR BLD CALC: 26.4 % (ref 36–48)
HCT VFR BLD CALC: 28.8 % (ref 36–48)
HEMOGLOBIN, ART, EXTENDED: 9.2 G/DL (ref 12–16)
HEMOGLOBIN: 7.8 G/DL (ref 12–16)
HEMOGLOBIN: 7.8 G/DL (ref 12–16)
HEMOGLOBIN: 8.6 G/DL (ref 12–16)
HEMOGLOBIN: 9.1 G/DL (ref 12–16)
INR BLD: 0.99 (ref 0.86–1.14)
IRON SATURATION: 26 % (ref 15–50)
IRON: 73 UG/DL (ref 37–145)
LV EF: 65 %
LVEF MODALITY: NORMAL
LYMPHOCYTES ABSOLUTE: 1 K/UL (ref 1–5.1)
LYMPHOCYTES RELATIVE PERCENT: 10.2 %
MCH RBC QN AUTO: 25.4 PG (ref 26–34)
MCHC RBC AUTO-ENTMCNC: 32.7 G/DL (ref 31–36)
MCV RBC AUTO: 77.6 FL (ref 80–100)
METHEMOGLOBIN ARTERIAL: 0.6 %
MONOCYTES ABSOLUTE: 0.6 K/UL (ref 0–1.3)
MONOCYTES RELATIVE PERCENT: 6.2 %
NEUTROPHILS ABSOLUTE: 7.9 K/UL (ref 1.7–7.7)
NEUTROPHILS RELATIVE PERCENT: 83 %
O2 CONTENT ARTERIAL: 12 ML/DL
O2 SAT, ARTERIAL: 91.9 %
O2 THERAPY: ABNORMAL
PCO2 ARTERIAL: 48.6 MMHG (ref 35–45)
PDW BLD-RTO: 16.9 % (ref 12.4–15.4)
PERFORMED ON: ABNORMAL
PERFORMED ON: NORMAL
PH ARTERIAL: 7.39 (ref 7.35–7.45)
PLATELET # BLD: 270 K/UL (ref 135–450)
PMV BLD AUTO: 7 FL (ref 5–10.5)
PO2 ARTERIAL: 67.1 MMHG (ref 75–108)
POTASSIUM REFLEX MAGNESIUM: 4.2 MMOL/L (ref 3.5–5.1)
PROTHROMBIN TIME: 11.3 SEC (ref 9.8–13)
RBC # BLD: 3.41 M/UL (ref 4–5.2)
SODIUM BLD-SCNC: 135 MMOL/L (ref 136–145)
TCO2 ARTERIAL: 30.2 MMOL/L
TOTAL IRON BINDING CAPACITY: 285 UG/DL (ref 260–445)
TROPONIN: <0.01 NG/ML
WBC # BLD: 9.5 K/UL (ref 4–11)

## 2018-10-13 PROCEDURE — 6360000002 HC RX W HCPCS: Performed by: INTERNAL MEDICINE

## 2018-10-13 PROCEDURE — 94640 AIRWAY INHALATION TREATMENT: CPT

## 2018-10-13 PROCEDURE — 85025 COMPLETE CBC W/AUTO DIFF WBC: CPT

## 2018-10-13 PROCEDURE — 36415 COLL VENOUS BLD VENIPUNCTURE: CPT

## 2018-10-13 PROCEDURE — 93306 TTE W/DOPPLER COMPLETE: CPT | Performed by: INTERNAL MEDICINE

## 2018-10-13 PROCEDURE — 36600 WITHDRAWAL OF ARTERIAL BLOOD: CPT

## 2018-10-13 PROCEDURE — 85610 PROTHROMBIN TIME: CPT

## 2018-10-13 PROCEDURE — 99223 1ST HOSP IP/OBS HIGH 75: CPT | Performed by: INTERNAL MEDICINE

## 2018-10-13 PROCEDURE — 93010 ELECTROCARDIOGRAM REPORT: CPT | Performed by: INTERNAL MEDICINE

## 2018-10-13 PROCEDURE — 94761 N-INVAS EAR/PLS OXIMETRY MLT: CPT

## 2018-10-13 PROCEDURE — 6370000000 HC RX 637 (ALT 250 FOR IP): Performed by: INTERNAL MEDICINE

## 2018-10-13 PROCEDURE — 92526 ORAL FUNCTION THERAPY: CPT

## 2018-10-13 PROCEDURE — 1200000000 HC SEMI PRIVATE

## 2018-10-13 PROCEDURE — 2580000003 HC RX 258: Performed by: INTERNAL MEDICINE

## 2018-10-13 PROCEDURE — 92610 EVALUATE SWALLOWING FUNCTION: CPT

## 2018-10-13 PROCEDURE — 6370000000 HC RX 637 (ALT 250 FOR IP): Performed by: NURSE PRACTITIONER

## 2018-10-13 PROCEDURE — 85014 HEMATOCRIT: CPT

## 2018-10-13 PROCEDURE — G0328 FECAL BLOOD SCRN IMMUNOASSAY: HCPCS

## 2018-10-13 PROCEDURE — G8996 SWALLOW CURRENT STATUS: HCPCS

## 2018-10-13 PROCEDURE — 82803 BLOOD GASES ANY COMBINATION: CPT

## 2018-10-13 PROCEDURE — 2700000000 HC OXYGEN THERAPY PER DAY

## 2018-10-13 PROCEDURE — 80048 BASIC METABOLIC PNL TOTAL CA: CPT

## 2018-10-13 PROCEDURE — 85018 HEMOGLOBIN: CPT

## 2018-10-13 PROCEDURE — G8997 SWALLOW GOAL STATUS: HCPCS

## 2018-10-13 PROCEDURE — 84484 ASSAY OF TROPONIN QUANT: CPT

## 2018-10-13 PROCEDURE — C9113 INJ PANTOPRAZOLE SODIUM, VIA: HCPCS | Performed by: INTERNAL MEDICINE

## 2018-10-13 RX ORDER — NICOTINE 21 MG/24HR
1 PATCH, TRANSDERMAL 24 HOURS TRANSDERMAL DAILY
Status: DISCONTINUED | OUTPATIENT
Start: 2018-10-13 | End: 2018-10-22 | Stop reason: HOSPADM

## 2018-10-13 RX ORDER — DOXYCYCLINE HYCLATE 100 MG
100 TABLET ORAL EVERY 12 HOURS SCHEDULED
Status: DISCONTINUED | OUTPATIENT
Start: 2018-10-13 | End: 2018-10-13

## 2018-10-13 RX ORDER — MORPHINE SULFATE 2 MG/ML
2 INJECTION, SOLUTION INTRAMUSCULAR; INTRAVENOUS EVERY 4 HOURS PRN
Status: DISCONTINUED | OUTPATIENT
Start: 2018-10-13 | End: 2018-10-17

## 2018-10-13 RX ADMIN — CEFTRIAXONE SODIUM 1 G: 1 INJECTION, POWDER, FOR SOLUTION INTRAMUSCULAR; INTRAVENOUS at 11:20

## 2018-10-13 RX ADMIN — MORPHINE SULFATE 2 MG: 2 INJECTION, SOLUTION INTRAMUSCULAR; INTRAVENOUS at 18:31

## 2018-10-13 RX ADMIN — INSULIN HUMAN 5 UNITS: 100 INJECTION, SOLUTION PARENTERAL at 01:14

## 2018-10-13 RX ADMIN — IPRATROPIUM BROMIDE AND ALBUTEROL SULFATE 3 ML: .5; 3 SOLUTION RESPIRATORY (INHALATION) at 08:31

## 2018-10-13 RX ADMIN — PIPERACILLIN SODIUM,TAZOBACTAM SODIUM 3.38 G: 3; .375 INJECTION, POWDER, FOR SOLUTION INTRAVENOUS at 14:08

## 2018-10-13 RX ADMIN — PIPERACILLIN SODIUM,TAZOBACTAM SODIUM 3.38 G: 3; .375 INJECTION, POWDER, FOR SOLUTION INTRAVENOUS at 20:06

## 2018-10-13 RX ADMIN — MORPHINE SULFATE 2 MG: 2 INJECTION, SOLUTION INTRAMUSCULAR; INTRAVENOUS at 11:22

## 2018-10-13 RX ADMIN — PANTOPRAZOLE SODIUM 40 MG: 40 INJECTION, POWDER, FOR SOLUTION INTRAVENOUS at 12:04

## 2018-10-13 RX ADMIN — PANTOPRAZOLE SODIUM 40 MG: 40 INJECTION, POWDER, FOR SOLUTION INTRAVENOUS at 20:04

## 2018-10-13 RX ADMIN — IPRATROPIUM BROMIDE AND ALBUTEROL SULFATE 3 ML: .5; 3 SOLUTION RESPIRATORY (INHALATION) at 16:25

## 2018-10-13 RX ADMIN — IPRATROPIUM BROMIDE AND ALBUTEROL SULFATE 3 ML: .5; 3 SOLUTION RESPIRATORY (INHALATION) at 12:11

## 2018-10-13 RX ADMIN — IPRATROPIUM BROMIDE AND ALBUTEROL SULFATE 3 ML: .5; 3 SOLUTION RESPIRATORY (INHALATION) at 20:16

## 2018-10-13 ASSESSMENT — PAIN SCALES - GENERAL
PAINLEVEL_OUTOF10: 9
PAINLEVEL_OUTOF10: 10
PAINLEVEL_OUTOF10: 0

## 2018-10-13 NOTE — PLAN OF CARE
Problem: Nutrition  Intervention: Swallowing evaluation  SLP completed evaluation. Please refer to notes in EMR. Intervention: Aspiration precautions  SLP completed evaluation. Please refer to notes in EMR. Comments: Jeannette Castro [place], [id number]  Speech-Language Pathologist  (desk #): [phone]

## 2018-10-13 NOTE — CONSULTS
INPATIENT PULMONARY CRITICAL CARE CONSULT NOTE      Chief Complaint/Referring Provider:  Patient is being seen at the request of Dr. Danielle Leon  for a consultation for Acute on chronic resp failure ;had recent biopsy for supraglottic mass at [facility]      Presenting HPI: Patient came to the hospital with increasing shortness of breath    As per admitting physician-68 y.o. female with PMH of HTN , Chronic Hypoxic Respiratory failure on 2L/min O2 by NC , recently underwent Bx of Supraglottic Mass at [facility] on 10/4/18 who presented to [facility] ED with C/o SOB  This AM . She was Sent home from ED after initial evaluation and patient's family called EMS as patient got obtunded with worsening shortness of breath. As per ED physician report, medics reported everyone in the house smokes and they weren't sure whether patient was waiting oxygen at that time. As per EMS report, patient was hypoxic with oxygen saturations at 36% . She was placed on CPAP and was brought to the hospital for further evaluation and management . Patient reports intermittent cough but denies any fever, chills, nausea, vomiting. Also reports diarrhea all day this morning denies any recent antibiotic usage. Denies any recent sick contacts. Patient reports that she saw her PCP and was reinforced of wearing Supplemental O2 at home .      ED Course : Patient had basic labs in ED , which showed creatinine of 1.7,which seems to be her baseline . Chest x-ray concerning for bibasilar pneumonia . Patient received Rocephin and azithromycin ×1 after obtaining blood cultures in ED and is being admitted to the hospital for further evaluation and management.     Upon evaluation by me in ED, patient seen with family at bedside. Feels slightly better and saturating in mid 90s on 4L/min  oxygen by nasal cannula. Patient offers no new complaints.       Patient when seen this morning stated that she was having cough along with that patient has been having pm TECHNIQUE: CT of the chest was performed without the administration of intravenous contrast. Multiplanar reformatted images are provided for review. Dose modulation, iterative reconstruction, and/or weight based adjustment of the mA/kV was utilized to reduce the radiation dose to as low as reasonably achievable. COMPARISON: 04/24/2018 HISTORY: ORDERING SYSTEM PROVIDED HISTORY: DYSPNEA, ON EXERTION TECHNOLOGIST PROVIDED HISTORY: Ordering Physician Provided Reason for Exam: SOB since Friday - 85% on RA Acuity: Acute Type of Exam: Initial Relevant Medical/Surgical History: smoker many years FINDINGS: Mediastinum: No mediastinal lymphadenopathy is identified. The main pulmonary artery is dilated, measuring 3.0 cm. Noncontrast imaging of the pulmonary arteries is otherwise unremarkable. Noncontrast imaging of the thoracic aorta is unremarkable for the patient's age, with evidence of mild calcinosis. There is mild mural thickening involving the lower esophagus. Lungs/pleura: Emphysema is noted. Mild dependent atelectasis found bilaterally. Lungs are otherwise clear. Secretions within the airways are detected, mainly within the trachea. Upper Abdomen: Benign adenomas are again identified within the adrenal glands, unchanged in size. Images of the upper abdomen are otherwise unremarkable. Soft Tissues/Bones: No osteolytic or osteoblastic bone lesions. No acute fractures. No definite acute abnormality detected. No focal infiltrate is found. Secretions within the central airways, raising the possibility of bronchitis. Results for Teodoro Dudley (MRN [id number]) as of 10/13/2018 11:14   Ref.  Range 9/7/2018 15:04 10/3/2018 16:45 10/3/2018 20:30 10/3/2018 21:40 10/12/2018 07:45 10/12/2018 14:58 10/12/2018 17:12 10/12/2018 20:20 10/12/2018 20:34 10/12/2018 20:44 10/12/2018 23:36 10/13/2018 01:48 10/13/2018 05:29   Sodium Latest Ref Range: 136 - 145 mmol/L 127 (L) 135 (L)   135 (L)        135 (L)   Potassium

## 2018-10-13 NOTE — CONSULTS
GI Consult Note      Reason for Consult:  Melena  Requesting Physician:  Rajani Brunson COMPLAINT:  Dyspnea    History Obtained From:  patient, electronic medical record, referring physician    HISTORY OF PRESENT ILLNESS:                The patient is a 80 y.o. female with significant past medical history of COPD, CHF, DM, CVA and recently diagnosed throat cancer who presents with dyspnea in the setting of non compliance. Pneumonia is suspected and a pleural effusion is contributing. Significant hemoptysis is noted. She claims to have had intermittent black stools for 2 months. A supraglottic mass was biopsied last week the pathology report of which is not available. She recall having had gastric ulcers many years ago. ASA and Plavix are used though she is not on a PPI. Denies abdominal pain. SLP evaluation has determined that she presents a very high risk of aspiration with any PO intake.     Past Medical History:        Diagnosis Date    Acute on chronic congestive heart failure (HCC)     Arthritis     Carotid stenosis     right    Chronic back pain     DDD (degenerative disc disease) lumbar    Diabetes mellitus (HCC)     Hyperlipidemia     Hypertension     Neuropathy     Pneumonia     Stroke ([facility] [address] 7/1/2013    Throat cancer ([facility] [address]     Thyroid disease     hypothyroidism     Past Surgical History:        Procedure Laterality Date    BACK SURGERY      COLONOSCOPY  01/09/2018    polyp removal    EYE SURGERY Right 2/22/2013    cataract removal     Current Medications:    Current Facility-Administered Medications: albuterol sulfate  (90 Base) MCG/ACT inhaler 2 puff, 2 puff, Inhalation, Q4H PRN  atorvastatin (LIPITOR) tablet 10 mg, 10 mg, Oral, Daily  benzonatate (TESSALON) capsule 100 mg, 100 mg, Oral, TID PRN  levothyroxine (SYNTHROID) tablet 175 mcg, 175 mcg, Oral, Daily  PARoxetine (PAXIL) tablet 20 mg, 20 mg, Oral, Daily  azithromycin (ZITHROMAX) 500 mg in D5W 250ml addavial, 500 mg,

## 2018-10-13 NOTE — PROGRESS NOTES
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 Acute on chronic respiratory failure with hypoxia - Patient supposed  to be on 2 L/m oxygen per nasal cannula but she is noncompliant and currently requiring 4 L/m oxygen by nasal cannula. Wean oxygen as able to home requirement with a goal SPO2 of more than 90%  - Consulted Pulmonology  - added doxycycline      2. Possible left-sided pneumonia with pleural effusion - check sputum culture, swallow evaluation on 10/13/18- S/o Patient high risk for aspiration, received Rocephin and azithromycin ×1 in ED-continue pending blood and sputum cultures. Continue home inhalers  - Rocephin and is to change to Zosyn by pulmonology     3. Hypertension - optimal control on home medications - continue      4. Type 2 diabetes mellitus - last A1c on 8/13/2018 8.8% . Continue home dose of Lantus and cover with sliding scale ; hold metformin while inpatient     5. Hypothyroidism - continue home dose of Synthroid     6. Hemoptysis in the  setting of Recent Biopsy of   supraglottic mass - patient had biopsy of supraglottic mass at  a week ago ; results pending - advised outpatient follow-up  - Hold Home dose of Aspirin and Plavix for now and monitor Hb      7. HLD - continue statin     8. CKD stage III - Baseline Cr ~ 1.8 . Monitor BMP . Avoid Nephrotoxins      9. Elevated troponin in the setting of CKD - patient currently is chest pain-free      10.   Acute on chronic systolic CHF - partly contributing to

## 2018-10-14 ENCOUNTER — APPOINTMENT (OUTPATIENT)
Dept: GENERAL RADIOLOGY | Age: 68
DRG: 177 | End: 2018-10-14
Payer: MEDICARE

## 2018-10-14 LAB
ANION GAP SERPL CALCULATED.3IONS-SCNC: 7 MMOL/L (ref 3–16)
BASOPHILS ABSOLUTE: 0.1 K/UL (ref 0–0.2)
BASOPHILS RELATIVE PERCENT: 0.6 %
BUN BLDV-MCNC: 46 MG/DL (ref 7–20)
CALCIUM SERPL-MCNC: 8.3 MG/DL (ref 8.3–10.6)
CHLORIDE BLD-SCNC: 106 MMOL/L (ref 99–110)
CO2: 31 MMOL/L (ref 21–32)
CREAT SERPL-MCNC: 2 MG/DL (ref 0.6–1.2)
CULTURE, RESPIRATORY: NORMAL
EOSINOPHILS ABSOLUTE: 0 K/UL (ref 0–0.6)
EOSINOPHILS RELATIVE PERCENT: 0 %
GFR AFRICAN AMERICAN: 30
GFR NON-AFRICAN AMERICAN: 25
GLUCOSE BLD-MCNC: 109 MG/DL (ref 70–99)
GLUCOSE BLD-MCNC: 109 MG/DL (ref 70–99)
GLUCOSE BLD-MCNC: 72 MG/DL (ref 70–99)
GLUCOSE BLD-MCNC: 73 MG/DL (ref 70–99)
GLUCOSE BLD-MCNC: 73 MG/DL (ref 70–99)
GLUCOSE BLD-MCNC: 75 MG/DL (ref 70–99)
GLUCOSE BLD-MCNC: 94 MG/DL (ref 70–99)
GRAM STAIN RESULT: NORMAL
HCT VFR BLD CALC: 24 % (ref 36–48)
HCT VFR BLD CALC: 24.4 % (ref 36–48)
HCT VFR BLD CALC: 25.5 % (ref 36–48)
HEMOGLOBIN: 7.8 G/DL (ref 12–16)
HEMOGLOBIN: 7.8 G/DL (ref 12–16)
HEMOGLOBIN: 8.2 G/DL (ref 12–16)
LYMPHOCYTES ABSOLUTE: 1.8 K/UL (ref 1–5.1)
LYMPHOCYTES RELATIVE PERCENT: 15.6 %
MCH RBC QN AUTO: 25.3 PG (ref 26–34)
MCHC RBC AUTO-ENTMCNC: 32.3 G/DL (ref 31–36)
MCV RBC AUTO: 78.5 FL (ref 80–100)
MONOCYTES ABSOLUTE: 0.7 K/UL (ref 0–1.3)
MONOCYTES RELATIVE PERCENT: 6.1 %
NEUTROPHILS ABSOLUTE: 9 K/UL (ref 1.7–7.7)
NEUTROPHILS RELATIVE PERCENT: 77.7 %
PDW BLD-RTO: 17.2 % (ref 12.4–15.4)
PERFORMED ON: ABNORMAL
PERFORMED ON: ABNORMAL
PERFORMED ON: NORMAL
PLATELET # BLD: 290 K/UL (ref 135–450)
PMV BLD AUTO: 7 FL (ref 5–10.5)
POTASSIUM REFLEX MAGNESIUM: 4.3 MMOL/L (ref 3.5–5.1)
RBC # BLD: 3.06 M/UL (ref 4–5.2)
SODIUM BLD-SCNC: 144 MMOL/L (ref 136–145)
WBC # BLD: 11.6 K/UL (ref 4–11)

## 2018-10-14 PROCEDURE — 6370000000 HC RX 637 (ALT 250 FOR IP): Performed by: INTERNAL MEDICINE

## 2018-10-14 PROCEDURE — 1200000000 HC SEMI PRIVATE

## 2018-10-14 PROCEDURE — 94664 DEMO&/EVAL PT USE INHALER: CPT

## 2018-10-14 PROCEDURE — 94761 N-INVAS EAR/PLS OXIMETRY MLT: CPT

## 2018-10-14 PROCEDURE — 2580000003 HC RX 258: Performed by: INTERNAL MEDICINE

## 2018-10-14 PROCEDURE — 99233 SBSQ HOSP IP/OBS HIGH 50: CPT | Performed by: INTERNAL MEDICINE

## 2018-10-14 PROCEDURE — 85014 HEMATOCRIT: CPT

## 2018-10-14 PROCEDURE — 85018 HEMOGLOBIN: CPT

## 2018-10-14 PROCEDURE — 80048 BASIC METABOLIC PNL TOTAL CA: CPT

## 2018-10-14 PROCEDURE — 6360000002 HC RX W HCPCS: Performed by: INTERNAL MEDICINE

## 2018-10-14 PROCEDURE — 2700000000 HC OXYGEN THERAPY PER DAY

## 2018-10-14 PROCEDURE — C9113 INJ PANTOPRAZOLE SODIUM, VIA: HCPCS | Performed by: INTERNAL MEDICINE

## 2018-10-14 PROCEDURE — 94640 AIRWAY INHALATION TREATMENT: CPT

## 2018-10-14 PROCEDURE — 36415 COLL VENOUS BLD VENIPUNCTURE: CPT

## 2018-10-14 PROCEDURE — 71046 X-RAY EXAM CHEST 2 VIEWS: CPT

## 2018-10-14 PROCEDURE — 85025 COMPLETE CBC W/AUTO DIFF WBC: CPT

## 2018-10-14 RX ORDER — DEXTROSE MONOHYDRATE 50 MG/ML
INJECTION, SOLUTION INTRAVENOUS CONTINUOUS
Status: DISCONTINUED | OUTPATIENT
Start: 2018-10-14 | End: 2018-10-21

## 2018-10-14 RX ADMIN — IPRATROPIUM BROMIDE AND ALBUTEROL SULFATE 3 ML: .5; 3 SOLUTION RESPIRATORY (INHALATION) at 12:06

## 2018-10-14 RX ADMIN — MORPHINE SULFATE 2 MG: 2 INJECTION, SOLUTION INTRAMUSCULAR; INTRAVENOUS at 12:31

## 2018-10-14 RX ADMIN — PIPERACILLIN SODIUM,TAZOBACTAM SODIUM 3.38 G: 3; .375 INJECTION, POWDER, FOR SOLUTION INTRAVENOUS at 20:06

## 2018-10-14 RX ADMIN — PANTOPRAZOLE SODIUM 40 MG: 40 INJECTION, POWDER, FOR SOLUTION INTRAVENOUS at 08:31

## 2018-10-14 RX ADMIN — DEXTROSE MONOHYDRATE: 50 INJECTION, SOLUTION INTRAVENOUS at 09:55

## 2018-10-14 RX ADMIN — IPRATROPIUM BROMIDE AND ALBUTEROL SULFATE 3 ML: .5; 3 SOLUTION RESPIRATORY (INHALATION) at 20:19

## 2018-10-14 RX ADMIN — MORPHINE SULFATE 2 MG: 2 INJECTION, SOLUTION INTRAMUSCULAR; INTRAVENOUS at 05:54

## 2018-10-14 RX ADMIN — MORPHINE SULFATE 2 MG: 2 INJECTION, SOLUTION INTRAMUSCULAR; INTRAVENOUS at 01:39

## 2018-10-14 RX ADMIN — PANTOPRAZOLE SODIUM 40 MG: 40 INJECTION, POWDER, FOR SOLUTION INTRAVENOUS at 20:06

## 2018-10-14 RX ADMIN — MORPHINE SULFATE 2 MG: 2 INJECTION, SOLUTION INTRAMUSCULAR; INTRAVENOUS at 21:43

## 2018-10-14 RX ADMIN — PIPERACILLIN SODIUM,TAZOBACTAM SODIUM 3.38 G: 3; .375 INJECTION, POWDER, FOR SOLUTION INTRAVENOUS at 13:24

## 2018-10-14 RX ADMIN — IPRATROPIUM BROMIDE AND ALBUTEROL SULFATE 3 ML: .5; 3 SOLUTION RESPIRATORY (INHALATION) at 16:26

## 2018-10-14 RX ADMIN — MORPHINE SULFATE 2 MG: 2 INJECTION, SOLUTION INTRAMUSCULAR; INTRAVENOUS at 16:45

## 2018-10-14 RX ADMIN — PIPERACILLIN SODIUM,TAZOBACTAM SODIUM 3.38 G: 3; .375 INJECTION, POWDER, FOR SOLUTION INTRAVENOUS at 05:56

## 2018-10-14 RX ADMIN — IPRATROPIUM BROMIDE AND ALBUTEROL SULFATE 3 ML: .5; 3 SOLUTION RESPIRATORY (INHALATION) at 08:50

## 2018-10-14 ASSESSMENT — PAIN SCALES - GENERAL
PAINLEVEL_OUTOF10: 10
PAINLEVEL_OUTOF10: 8
PAINLEVEL_OUTOF10: 8
PAINLEVEL_OUTOF10: 3
PAINLEVEL_OUTOF10: 10
PAINLEVEL_OUTOF10: 10

## 2018-10-14 ASSESSMENT — PAIN DESCRIPTION - LOCATION
LOCATION: THROAT
LOCATION: THROAT

## 2018-10-14 ASSESSMENT — PAIN DESCRIPTION - FREQUENCY: FREQUENCY: INTERMITTENT

## 2018-10-14 ASSESSMENT — PAIN DESCRIPTION - DESCRIPTORS: DESCRIPTORS: DISCOMFORT;ACHING

## 2018-10-14 ASSESSMENT — PAIN DESCRIPTION - PAIN TYPE
TYPE: ACUTE PAIN
TYPE: ACUTE PAIN

## 2018-10-14 ASSESSMENT — PAIN DESCRIPTION - ONSET: ONSET: GRADUAL

## 2018-10-14 NOTE — PROGRESS NOTES
results pending - advised outpatient follow-up  - Hold Home dose of Aspirin and Plavix for now and monitor Hb      7. HLD - continue statin     8. CKD stage III - Baseline Cr ~ 1.8 . Monitor BMP . Avoid Nephrotoxins      9. Elevated troponin in the setting of CKD - patient currently is chest pain-free      10. Acute on chronic Systolic CHF - partly contributing to present hypoxia ; last echo dated 4/25/2018 suggestive of LVEF 45-50% with akinesis of basal inferior wall; grade 2 diastolic dysfunction with severely dilated left atrium.   - Checked  proBNP - 1978 - Received  20 mg IV Lasix x1  and monitor I and O's and consider IV Lasix as needed. - Patient not on any diuretics at home.  - Repeat Echo on 10/13/18 with normal EF 65%; no regional wall motion abnormalities  and trended Troponin - Negative     11. Acute blood loss anemia in the setting of hemoptysis and melanotic stools - probably bleeding from recent biopsy of supraglottic mass at  a week ago prior to this admission   - Holding aspirin and Plavix and monitoring hemoglobin every 8 hours with plans to transfuse for hemoglobin less than 7  - GI consulted with plans to do EGD and possible PEG tube placement once respiratory status improves. - Pulmonology evaluated and recommended transfer to  for possible ENT evaluation and management of hemoptysis     12. Severe dysphagia with high risk of aspiration - as per bedside swallow evaluation by SLP on 10/13/2018  - Patient was recommended strict nothing by mouth and to consider alternative means of nutrition .   - Discussed with patient and her son Nancy Summers [facility]) regarding PEG tube placement - who are in agreement to proceed  - GI plans on PEG tube placement and possible EGD on Monday once respiratory status improves      DVT Prophylaxis: Lovenox   Diet: Diet NPO Effective Now  Code Status: Full Code    PT/OT Eval Status:  Will need     Dispo - likely 2-3 days pending clinical improvement and possible

## 2018-10-14 NOTE — PROGRESS NOTES
affect. Behavior is normal.  No anxiety. Neurologic: Alert, awake and oriented. PERRL. Speech fluent    Results:  CBC:   Recent Labs      10/12/18   0745   10/13/18   0529   10/13/18   2323  10/14/18   0527  10/14/18   0528   WBC  9.5   --   9.5   --    --    --   11.6*   HGB  11.0*   < >  8.6*   < >  7.8*  7.8*  7.8*   HCT  34.3*   < >  26.4*   < >  24.5*  24.4*  24.0*   MCV  78.7*   --   77.6*   --    --    --   78.5*   PLT  307   --   270   --    --    --   290    < > = values in this interval not displayed. BMP:   Recent Labs      10/12/18   0745  10/13/18   0529  10/14/18   0528   NA  135*  135*  144   K  3.8  4.2  4.3   CL  97*  99  106   CO2  29  28  31   BUN  17  59*  46*   CREATININE  1.7*  1.8*  2.0*     LIVER PROFILE:   Recent Labs      10/12/18   0745   AST  10*   ALT  6*   BILITOT  <0.2   ALKPHOS  61     PT/INR:   Recent Labs      10/13/18   1108   PROTIME  11.3   INR  0.99         Imaging:  I have reviewed radiology images personally. XR CHEST STANDARD (2 VW)   Final Result   Persistent small left pleural effusion with left basilar atelectasis or   pneumonia. Follow-up to resolution recommended. Results for Myra Duke (MRN [id number]) as of 10/14/2018 10:11   Ref.  Range 10/13/2018 01:48 10/13/2018 05:29 10/13/2018 07:48 10/13/2018 09:28 10/13/2018 11:54 10/13/2018 19:54 10/13/2018 23:26 10/14/2018 05:28   Sodium Latest Ref Range: 136 - 145 mmol/L  135 (L)      144   Potassium Latest Ref Range: 3.5 - 5.1 mmol/L  4.2      4.3   Chloride Latest Ref Range: 99 - 110 mmol/L  99      106   CO2 Latest Ref Range: 21 - 32 mmol/L  28      31   BUN Latest Ref Range: 7 - 20 mg/dL  59 (H)      46 (H)   Creatinine Latest Ref Range: 0.6 - 1.2 mg/dL  1.8 (H)      2.0 (H)   Anion Gap Latest Ref Range: 3 - 16   8      7   GFR Non- Latest Ref Range: >60   28 (A)      25 (A)   GFR  Latest Ref Range: >60   34 (A)      30 (A)   Glucose Latest Ref Range: 70 - 99 mg/dL

## 2018-10-15 LAB
ANION GAP SERPL CALCULATED.3IONS-SCNC: 8 MMOL/L (ref 3–16)
BASOPHILS ABSOLUTE: 0.1 K/UL (ref 0–0.2)
BASOPHILS RELATIVE PERCENT: 0.6 %
BUN BLDV-MCNC: 28 MG/DL (ref 7–20)
CALCIUM SERPL-MCNC: 8 MG/DL (ref 8.3–10.6)
CHLORIDE BLD-SCNC: 102 MMOL/L (ref 99–110)
CO2: 29 MMOL/L (ref 21–32)
CREAT SERPL-MCNC: 1.7 MG/DL (ref 0.6–1.2)
EOSINOPHILS ABSOLUTE: 0 K/UL (ref 0–0.6)
EOSINOPHILS RELATIVE PERCENT: 0.1 %
GFR AFRICAN AMERICAN: 36
GFR NON-AFRICAN AMERICAN: 30
GLUCOSE BLD-MCNC: 119 MG/DL (ref 70–99)
GLUCOSE BLD-MCNC: 120 MG/DL (ref 70–99)
GLUCOSE BLD-MCNC: 137 MG/DL (ref 70–99)
GLUCOSE BLD-MCNC: 141 MG/DL (ref 70–99)
GLUCOSE BLD-MCNC: 145 MG/DL (ref 70–99)
GLUCOSE BLD-MCNC: 153 MG/DL (ref 70–99)
GLUCOSE BLD-MCNC: 160 MG/DL (ref 70–99)
GLUCOSE BLD-MCNC: 174 MG/DL (ref 70–99)
GLUCOSE BLD-MCNC: 182 MG/DL (ref 70–99)
HCT VFR BLD CALC: 24.2 % (ref 36–48)
HCT VFR BLD CALC: 24.3 % (ref 36–48)
HCT VFR BLD CALC: 24.3 % (ref 36–48)
HCT VFR BLD CALC: 25.6 % (ref 36–48)
HEMOGLOBIN: 7.9 G/DL (ref 12–16)
HEMOGLOBIN: 8.3 G/DL (ref 12–16)
LYMPHOCYTES ABSOLUTE: 1.3 K/UL (ref 1–5.1)
LYMPHOCYTES RELATIVE PERCENT: 15 %
MAGNESIUM: 2 MG/DL (ref 1.8–2.4)
MCH RBC QN AUTO: 25.7 PG (ref 26–34)
MCHC RBC AUTO-ENTMCNC: 32.7 G/DL (ref 31–36)
MCV RBC AUTO: 78.7 FL (ref 80–100)
MONOCYTES ABSOLUTE: 0.5 K/UL (ref 0–1.3)
MONOCYTES RELATIVE PERCENT: 6.5 %
NEUTROPHILS ABSOLUTE: 6.6 K/UL (ref 1.7–7.7)
NEUTROPHILS RELATIVE PERCENT: 77.8 %
PDW BLD-RTO: 17.2 % (ref 12.4–15.4)
PERFORMED ON: ABNORMAL
PLATELET # BLD: 259 K/UL (ref 135–450)
PMV BLD AUTO: 6.9 FL (ref 5–10.5)
POTASSIUM REFLEX MAGNESIUM: 3.5 MMOL/L (ref 3.5–5.1)
RBC # BLD: 3.09 M/UL (ref 4–5.2)
SODIUM BLD-SCNC: 139 MMOL/L (ref 136–145)
WBC # BLD: 8.5 K/UL (ref 4–11)

## 2018-10-15 PROCEDURE — 6370000000 HC RX 637 (ALT 250 FOR IP): Performed by: INTERNAL MEDICINE

## 2018-10-15 PROCEDURE — 94761 N-INVAS EAR/PLS OXIMETRY MLT: CPT

## 2018-10-15 PROCEDURE — 99233 SBSQ HOSP IP/OBS HIGH 50: CPT | Performed by: INTERNAL MEDICINE

## 2018-10-15 PROCEDURE — 6360000002 HC RX W HCPCS: Performed by: INTERNAL MEDICINE

## 2018-10-15 PROCEDURE — 2580000003 HC RX 258: Performed by: INTERNAL MEDICINE

## 2018-10-15 PROCEDURE — 36415 COLL VENOUS BLD VENIPUNCTURE: CPT

## 2018-10-15 PROCEDURE — 80048 BASIC METABOLIC PNL TOTAL CA: CPT

## 2018-10-15 PROCEDURE — C9113 INJ PANTOPRAZOLE SODIUM, VIA: HCPCS | Performed by: INTERNAL MEDICINE

## 2018-10-15 PROCEDURE — 2700000000 HC OXYGEN THERAPY PER DAY

## 2018-10-15 PROCEDURE — 94640 AIRWAY INHALATION TREATMENT: CPT

## 2018-10-15 PROCEDURE — 85018 HEMOGLOBIN: CPT

## 2018-10-15 PROCEDURE — 1200000000 HC SEMI PRIVATE

## 2018-10-15 PROCEDURE — 85025 COMPLETE CBC W/AUTO DIFF WBC: CPT

## 2018-10-15 PROCEDURE — 92526 ORAL FUNCTION THERAPY: CPT

## 2018-10-15 PROCEDURE — 83735 ASSAY OF MAGNESIUM: CPT

## 2018-10-15 PROCEDURE — 85014 HEMATOCRIT: CPT

## 2018-10-15 RX ORDER — IPRATROPIUM BROMIDE AND ALBUTEROL SULFATE 2.5; .5 MG/3ML; MG/3ML
3 SOLUTION RESPIRATORY (INHALATION) EVERY 4 HOURS PRN
Status: DISCONTINUED | OUTPATIENT
Start: 2018-10-15 | End: 2018-10-22 | Stop reason: HOSPADM

## 2018-10-15 RX ADMIN — PANTOPRAZOLE SODIUM 40 MG: 40 INJECTION, POWDER, FOR SOLUTION INTRAVENOUS at 08:31

## 2018-10-15 RX ADMIN — IPRATROPIUM BROMIDE AND ALBUTEROL SULFATE 3 ML: .5; 3 SOLUTION RESPIRATORY (INHALATION) at 16:53

## 2018-10-15 RX ADMIN — INSULIN LISPRO 1 UNITS: 100 INJECTION, SOLUTION INTRAVENOUS; SUBCUTANEOUS at 13:36

## 2018-10-15 RX ADMIN — PIPERACILLIN SODIUM,TAZOBACTAM SODIUM 3.38 G: 3; .375 INJECTION, POWDER, FOR SOLUTION INTRAVENOUS at 13:50

## 2018-10-15 RX ADMIN — PIPERACILLIN SODIUM,TAZOBACTAM SODIUM 3.38 G: 3; .375 INJECTION, POWDER, FOR SOLUTION INTRAVENOUS at 03:47

## 2018-10-15 RX ADMIN — INSULIN LISPRO 1 UNITS: 100 INJECTION, SOLUTION INTRAVENOUS; SUBCUTANEOUS at 16:39

## 2018-10-15 RX ADMIN — MORPHINE SULFATE 2 MG: 2 INJECTION, SOLUTION INTRAMUSCULAR; INTRAVENOUS at 20:37

## 2018-10-15 RX ADMIN — PIPERACILLIN SODIUM,TAZOBACTAM SODIUM 3.38 G: 3; .375 INJECTION, POWDER, FOR SOLUTION INTRAVENOUS at 20:25

## 2018-10-15 RX ADMIN — MORPHINE SULFATE 2 MG: 2 INJECTION, SOLUTION INTRAMUSCULAR; INTRAVENOUS at 12:37

## 2018-10-15 RX ADMIN — DEXTROSE MONOHYDRATE: 50 INJECTION, SOLUTION INTRAVENOUS at 07:46

## 2018-10-15 RX ADMIN — MORPHINE SULFATE 2 MG: 2 INJECTION, SOLUTION INTRAMUSCULAR; INTRAVENOUS at 07:02

## 2018-10-15 RX ADMIN — IPRATROPIUM BROMIDE AND ALBUTEROL SULFATE 3 ML: .5; 3 SOLUTION RESPIRATORY (INHALATION) at 12:31

## 2018-10-15 RX ADMIN — IPRATROPIUM BROMIDE AND ALBUTEROL SULFATE 3 ML: .5; 3 SOLUTION RESPIRATORY (INHALATION) at 08:11

## 2018-10-15 RX ADMIN — PANTOPRAZOLE SODIUM 40 MG: 40 INJECTION, POWDER, FOR SOLUTION INTRAVENOUS at 20:25

## 2018-10-15 RX ADMIN — MORPHINE SULFATE 2 MG: 2 INJECTION, SOLUTION INTRAMUSCULAR; INTRAVENOUS at 16:37

## 2018-10-15 ASSESSMENT — PAIN DESCRIPTION - DESCRIPTORS: DESCRIPTORS: DISCOMFORT;ACHING

## 2018-10-15 ASSESSMENT — PAIN SCALES - GENERAL
PAINLEVEL_OUTOF10: 10
PAINLEVEL_OUTOF10: 10
PAINLEVEL_OUTOF10: 8
PAINLEVEL_OUTOF10: 10

## 2018-10-15 ASSESSMENT — PAIN DESCRIPTION - ORIENTATION: ORIENTATION: RIGHT;LEFT

## 2018-10-15 ASSESSMENT — PAIN DESCRIPTION - ONSET: ONSET: ON-GOING

## 2018-10-15 ASSESSMENT — PAIN DESCRIPTION - FREQUENCY: FREQUENCY: CONTINUOUS

## 2018-10-15 ASSESSMENT — PAIN DESCRIPTION - PAIN TYPE: TYPE: ACUTE PAIN

## 2018-10-15 ASSESSMENT — PAIN DESCRIPTION - LOCATION: LOCATION: THROAT

## 2018-10-15 NOTE — PROGRESS NOTES
Pulmonary & Critical Care Inpatient Progress Note   Harsh Dubon MD     REASON FOR TODAY'S VISIT:  Acute resp failure    SUBJECTIVE:   Down to 2 LPM which is the patient's self reported baseline  She denies any prior history of ascites and states that her abdomen is always \"full\" which she attributes to weight  She does have some mild persistent congestion which is improved    Scheduled Meds:   insulin lispro  0-6 Units Subcutaneous 6 times per day    nicotine  1 patch Transdermal Daily    piperacillin-tazobactam  3.375 g Intravenous Q8H    atorvastatin  10 mg Oral Daily    levothyroxine  175 mcg Oral Daily    PARoxetine  20 mg Oral Daily    lisinopril  40 mg Oral Daily    carvedilol  12.5 mg Oral BID WC    ipratropium-albuterol  3 mL Inhalation Q4H WA    pantoprazole  40 mg Intravenous BID       Continuous Infusions:   dextrose 50 mL/hr at 10/15/18 0746    dextrose         PRN Meds:  morphine, albuterol sulfate HFA, benzonatate, glucose, dextrose, glucagon (rDNA), dextrose, oxyCODONE    ALLERGIES:  Patient is allergic to celecoxib; codeine; and zoloft [sertraline hcl]. Objective:   PHYSICAL EXAM:  BP (!) 167/61   Pulse 81   Temp 98.3 °F (36.8 °C) (Oral)   Resp 16   Ht 5' 2\" (1.575 m)   Wt 142 lb (64.4 kg)   LMP  (LMP Unknown)   SpO2 97%   BMI 25.97 kg/m²    Physical Exam   Constitutional: She appears well-developed and well-nourished. No distress. HENT:   Head: Normocephalic and atraumatic. Mouth/Throat: Oropharynx is clear and moist. No oropharyngeal exudate. Eyes: Pupils are equal, round, and reactive to light. EOM are normal.   Neck: Neck supple. No JVD present. Cardiovascular: Normal heart sounds. Exam reveals no gallop and no friction rub. No murmur heard. Pulmonary/Chest: Effort normal. She has no wheezes. She has no rales. Equal chest rise and expansion bilaterally   Abdominal: Soft. Bowel sounds are normal. She exhibits no distension. There is no tenderness. Musculoskeletal: Normal range of motion. She exhibits no edema. Lymphadenopathy:     She has no cervical adenopathy. Neurological: She is alert. No cranial nerve deficit. CN 2-12 grossly intact   Skin: Skin is warm and dry. No rash noted. She is not diaphoretic. Data Reviewed:   LABS:  CBC:  Recent Labs      10/13/18   0529   10/14/18   0528   10/14/18   2356  10/15/18   0549  10/15/18   0751   WBC  9.5   --   11.6*   --    --   8.5   --    HGB  8.6*   < >  7.8*   < >  7.9*  7.9*  7.9*   HCT  26.4*   < >  24.0*   < >  24.3*  24.3*  24.2*   MCV  77.6*   --   78.5*   --    --   78.7*   --    PLT  270   --   290   --    --   259   --     < > = values in this interval not displayed. BMP:  Recent Labs      10/13/18   0529  10/14/18   0528  10/15/18   0549   NA  135*  144  139   K  4.2  4.3  3.5   CL  99  106  102   CO2  28  31  29   BUN  59*  46*  28*   CREATININE  1.8*  2.0*  1.7*     LIVER PROFILE: No results for input(s): AST, ALT, LIPASE, ALB, BILIDIR, BILITOT, ALKPHOS in the last 72 hours. Invalid input(s): AMYLASE  PT/INR:  Recent Labs      10/13/18   1108   PROTIME  11.3   INR  0.99     APTT: No results for input(s): APTT in the last 72 hours. UA:No results for input(s): NITRITE, COLORU, PHUR, LABCAST, WBCUA, RBCUA, MUCUS, TRICHOMONAS, YEAST, BACTERIA, CLARITYU, SPECGRAV, LEUKOCYTESUR, UROBILINOGEN, BILIRUBINUR, BLOODU, GLUCOSEU, AMORPHOUS in the last 72 hours. Invalid input(s): KETONESU  Recent Labs      10/13/18   0310   PHART  7.389   [id number]  48.6*   PO2ART  67.1*            CXR personally reviewed, left basilar airspace disease          Assessment:     1. Acute on chronic resp failure, hypoxic, improved  2. Left lower lobe aspiration pneumonia   -dysphagia, now strict NPO  3. Supraglottic mass with associated bleeding/hemoptysis  4. Acute systolic CHF    Plan:      -From a respiratory standpoint she is stable for GI intervention/PEG placement.  Will continue to support hypoxia with

## 2018-10-15 NOTE — PROGRESS NOTES
Nutrition Education    Type and Reason for Visit: Patient Education, Initial    Initial visit, patient seen for CHF education. Pt is a 80 y.o female with HX of HTN, Dm, CKD III, CHF, throat cancer. Pt seen in room this am. Pt was open to education, but had company at time of visit, briefly went over the importance of the heart healthy diet. Pt states she knows about the diet, and had no further questions. Left education material at pt bedside, will follow. · Verbally reviewed following information with Patient: low sodium diet  · Written educational materials provided. · Contact name and number provided. · Refer to Patient Education activity for more details.     Electronically signed by Anatoliy Gallego, dietetic student on 10/15/18 at 11:23 AM    Contact Number: Office: [phone]; [address]: [id number]

## 2018-10-15 NOTE — PROGRESS NOTES
Hospitalist Progress Note      PCP: SHELBY Pope - CNP    Date of Admission: 10/12/2018    Chief Complaint: Shortness of breath with hemoptysis    Hospital Course: Reviewed H&P    Subjective: Patient reports feeling slightly better this morning. Evaluated by SLP at bedside this morning and reports patient had tania aspiration of fluids and was recommended Strict NPO and Alternative means of Nutrition  . - patient had episodes of hemoptysis with blood clots overnight of 10/12/14  and her labs on 10/13/18  suggestive of drop in hemoglobin to 8.6 (from 11 ) ; her aspirin and Plavix were held since admission and started on PPI and GI has been consulted . - Discussed with GI Dr. Jamar Denny on 10/13/18 - who initially planned for EGD but later suspended given her high risk of aspiration and worsening pulmonary status.   Would plan on doing EGD with PEG tube placement once respiratory status improves   - I discussed SLP recommendations with patient and with her son Radha Bai over the phone) on 10/13/18  and they both are in agreement to proceed with PEG tube placement   - Hb relatively stable since Initial Drop ; Started on D5W today for patient being NPO and BG - 75 this AM     Medications:  Reviewed    Infusion Medications    dextrose 50 mL/hr at 10/15/18 0746    dextrose       Scheduled Medications    insulin lispro  0-6 Units Subcutaneous 6 times per day    nicotine  1 patch Transdermal Daily    piperacillin-tazobactam  3.375 g Intravenous Q8H    atorvastatin  10 mg Oral Daily    levothyroxine  175 mcg Oral Daily    PARoxetine  20 mg Oral Daily    lisinopril  40 mg Oral Daily    carvedilol  12.5 mg Oral BID WC    ipratropium-albuterol  3 mL Inhalation Q4H WA    pantoprazole  40 mg Intravenous BID     PRN Meds: morphine, albuterol sulfate HFA, benzonatate, glucose, dextrose, glucagon (rDNA), dextrose, oxyCODONE      Intake/Output Summary (Last 24 hours) at 10/15/18 9789  Last data filed at results pending - advised outpatient follow-up  - Hold Home dose of Aspirin and Plavix for now and monitor Hb      7. HLD - continue statin     8. CKD stage III - Baseline Cr ~ 1.8 . Monitor BMP . Avoid Nephrotoxins      9. Elevated troponin in the setting of CKD - patient currently is chest pain-free      10. Acute on chronic Systolic CHF - partly contributing to present hypoxia ; last echo dated 4/25/2018 suggestive of LVEF 45-50% with akinesis of basal inferior wall; grade 2 diastolic dysfunction with severely dilated left atrium.   - Checked  proBNP - 1978 - Received  20 mg IV Lasix x1  and monitor I and O's and consider IV Lasix as needed. - Patient not on any diuretics at home.  - Repeat Echo on 10/13/18 with normal EF 65%; no regional wall motion abnormalities  and trended Troponin - Negative     11. Acute blood loss anemia in the setting of hemoptysis and melanotic stools - probably bleeding from recent biopsy of supraglottic mass at  a week ago prior to this admission   - Holding aspirin and Plavix and monitoring hemoglobin every 8 hours with plans to transfuse for hemoglobin less than 7  - GI consulted with plans to do EGD and possible PEG tube placement once respiratory status improves. - Pulmonology evaluated and recommended transfer to  for possible ENT evaluation and management of hemoptysis, will evaluate pending EGD and PEG placement. 12.  Severe dysphagia with high risk of aspiration - as per bedside swallow evaluation by SLP on 10/13/2018  - Patient was recommended strict nothing by mouth and to consider alternative means of nutrition .   - Discussed with patient and her son Kaylee De Luna [facility]) regarding PEG tube placement - who are in agreement to proceed  - GI plans on PEG tube placement and possible EGD on Monday, currently at respiratory baseline     DVT Prophylaxis: Lovenox   Diet: Diet NPO Effective Now  Code Status: Full Code    PT/OT Eval Status:  Will need     Dispo - likely 2-3

## 2018-10-16 ENCOUNTER — APPOINTMENT (OUTPATIENT)
Dept: GENERAL RADIOLOGY | Age: 68
DRG: 177 | End: 2018-10-16
Payer: MEDICARE

## 2018-10-16 ENCOUNTER — ANESTHESIA (OUTPATIENT)
Dept: ENDOSCOPY | Age: 68
DRG: 177 | End: 2018-10-16
Payer: MEDICARE

## 2018-10-16 ENCOUNTER — ANESTHESIA EVENT (OUTPATIENT)
Dept: ENDOSCOPY | Age: 68
DRG: 177 | End: 2018-10-16
Payer: MEDICARE

## 2018-10-16 VITALS — DIASTOLIC BLOOD PRESSURE: 60 MMHG | OXYGEN SATURATION: 83 % | SYSTOLIC BLOOD PRESSURE: 144 MMHG

## 2018-10-16 DIAGNOSIS — I10 ESSENTIAL HYPERTENSION: ICD-10-CM

## 2018-10-16 LAB
ANION GAP SERPL CALCULATED.3IONS-SCNC: 7 MMOL/L (ref 3–16)
BASOPHILS ABSOLUTE: 0 K/UL (ref 0–0.2)
BASOPHILS RELATIVE PERCENT: 0.3 %
BUN BLDV-MCNC: 17 MG/DL (ref 7–20)
CALCIUM SERPL-MCNC: 8.4 MG/DL (ref 8.3–10.6)
CHLORIDE BLD-SCNC: 100 MMOL/L (ref 99–110)
CO2: 31 MMOL/L (ref 21–32)
CREAT SERPL-MCNC: 1.5 MG/DL (ref 0.6–1.2)
EOSINOPHILS ABSOLUTE: 0 K/UL (ref 0–0.6)
EOSINOPHILS RELATIVE PERCENT: 0.2 %
GFR AFRICAN AMERICAN: 42
GFR NON-AFRICAN AMERICAN: 35
GLUCOSE BLD-MCNC: 101 MG/DL (ref 70–99)
GLUCOSE BLD-MCNC: 107 MG/DL (ref 70–99)
GLUCOSE BLD-MCNC: 117 MG/DL (ref 70–99)
GLUCOSE BLD-MCNC: 133 MG/DL (ref 70–99)
GLUCOSE BLD-MCNC: 235 MG/DL (ref 70–99)
GLUCOSE BLD-MCNC: 93 MG/DL (ref 70–99)
HCT VFR BLD CALC: 23.3 % (ref 36–48)
HCT VFR BLD CALC: 26.2 % (ref 36–48)
HCT VFR BLD CALC: 27.6 % (ref 36–48)
HEMOGLOBIN: 7.6 G/DL (ref 12–16)
HEMOGLOBIN: 8.4 G/DL (ref 12–16)
HEMOGLOBIN: 8.6 G/DL (ref 12–16)
LYMPHOCYTES ABSOLUTE: 1.1 K/UL (ref 1–5.1)
LYMPHOCYTES RELATIVE PERCENT: 13.3 %
MAGNESIUM: 1.8 MG/DL (ref 1.8–2.4)
MCH RBC QN AUTO: 25.2 PG (ref 26–34)
MCHC RBC AUTO-ENTMCNC: 32.2 G/DL (ref 31–36)
MCV RBC AUTO: 78.5 FL (ref 80–100)
MONOCYTES ABSOLUTE: 0.6 K/UL (ref 0–1.3)
MONOCYTES RELATIVE PERCENT: 6.9 %
NEUTROPHILS ABSOLUTE: 6.8 K/UL (ref 1.7–7.7)
NEUTROPHILS RELATIVE PERCENT: 79.3 %
OCCULT BLOOD SCREENING: ABNORMAL
PDW BLD-RTO: 17.2 % (ref 12.4–15.4)
PERFORMED ON: ABNORMAL
PERFORMED ON: NORMAL
PLATELET # BLD: 253 K/UL (ref 135–450)
PMV BLD AUTO: 6.7 FL (ref 5–10.5)
POTASSIUM REFLEX MAGNESIUM: 3.3 MMOL/L (ref 3.5–5.1)
RBC # BLD: 3.34 M/UL (ref 4–5.2)
SODIUM BLD-SCNC: 138 MMOL/L (ref 136–145)
WBC # BLD: 8.6 K/UL (ref 4–11)

## 2018-10-16 PROCEDURE — 2709999900 HC NON-CHARGEABLE SUPPLY: Performed by: INTERNAL MEDICINE

## 2018-10-16 PROCEDURE — 2500000003 HC RX 250 WO HCPCS: Performed by: NURSE PRACTITIONER

## 2018-10-16 PROCEDURE — 2580000003 HC RX 258: Performed by: INTERNAL MEDICINE

## 2018-10-16 PROCEDURE — 3609012800 HC EGD DIAGNOSTIC ONLY: Performed by: INTERNAL MEDICINE

## 2018-10-16 PROCEDURE — 85025 COMPLETE CBC W/AUTO DIFF WBC: CPT

## 2018-10-16 PROCEDURE — 85018 HEMOGLOBIN: CPT

## 2018-10-16 PROCEDURE — 2700000000 HC OXYGEN THERAPY PER DAY

## 2018-10-16 PROCEDURE — 7100000011 HC PHASE II RECOVERY - ADDTL 15 MIN: Performed by: INTERNAL MEDICINE

## 2018-10-16 PROCEDURE — 7100000010 HC PHASE II RECOVERY - FIRST 15 MIN: Performed by: INTERNAL MEDICINE

## 2018-10-16 PROCEDURE — C9113 INJ PANTOPRAZOLE SODIUM, VIA: HCPCS | Performed by: INTERNAL MEDICINE

## 2018-10-16 PROCEDURE — 6360000002 HC RX W HCPCS: Performed by: INTERNAL MEDICINE

## 2018-10-16 PROCEDURE — 94761 N-INVAS EAR/PLS OXIMETRY MLT: CPT

## 2018-10-16 PROCEDURE — 80048 BASIC METABOLIC PNL TOTAL CA: CPT

## 2018-10-16 PROCEDURE — 6370000000 HC RX 637 (ALT 250 FOR IP): Performed by: INTERNAL MEDICINE

## 2018-10-16 PROCEDURE — 2580000003 HC RX 258: Performed by: NURSE ANESTHETIST, CERTIFIED REGISTERED

## 2018-10-16 PROCEDURE — 85014 HEMATOCRIT: CPT

## 2018-10-16 PROCEDURE — 36415 COLL VENOUS BLD VENIPUNCTURE: CPT

## 2018-10-16 PROCEDURE — 6360000002 HC RX W HCPCS: Performed by: NURSE ANESTHETIST, CERTIFIED REGISTERED

## 2018-10-16 PROCEDURE — 83735 ASSAY OF MAGNESIUM: CPT

## 2018-10-16 PROCEDURE — 0DJ08ZZ INSPECTION OF UPPER INTESTINAL TRACT, VIA NATURAL OR ARTIFICIAL OPENING ENDOSCOPIC: ICD-10-PCS | Performed by: INTERNAL MEDICINE

## 2018-10-16 PROCEDURE — 2580000003 HC RX 258

## 2018-10-16 PROCEDURE — 71045 X-RAY EXAM CHEST 1 VIEW: CPT

## 2018-10-16 PROCEDURE — 99232 SBSQ HOSP IP/OBS MODERATE 35: CPT | Performed by: INTERNAL MEDICINE

## 2018-10-16 PROCEDURE — 3700000000 HC ANESTHESIA ATTENDED CARE: Performed by: INTERNAL MEDICINE

## 2018-10-16 PROCEDURE — 1200000000 HC SEMI PRIVATE

## 2018-10-16 RX ORDER — SODIUM CHLORIDE 9 MG/ML
INJECTION, SOLUTION INTRAVENOUS
Status: COMPLETED
Start: 2018-10-16 | End: 2018-10-16

## 2018-10-16 RX ORDER — LEVOTHYROXINE SODIUM ANHYDROUS 100 UG/5ML
87.5 INJECTION, POWDER, LYOPHILIZED, FOR SOLUTION INTRAVENOUS DAILY
Status: DISCONTINUED | OUTPATIENT
Start: 2018-10-16 | End: 2018-10-22 | Stop reason: HOSPADM

## 2018-10-16 RX ORDER — SODIUM CHLORIDE, SODIUM LACTATE, POTASSIUM CHLORIDE, CALCIUM CHLORIDE 600; 310; 30; 20 MG/100ML; MG/100ML; MG/100ML; MG/100ML
INJECTION, SOLUTION INTRAVENOUS CONTINUOUS PRN
Status: DISCONTINUED | OUTPATIENT
Start: 2018-10-16 | End: 2018-10-16 | Stop reason: SDUPTHER

## 2018-10-16 RX ORDER — PROPOFOL 10 MG/ML
INJECTION, EMULSION INTRAVENOUS PRN
Status: DISCONTINUED | OUTPATIENT
Start: 2018-10-16 | End: 2018-10-16 | Stop reason: SDUPTHER

## 2018-10-16 RX ORDER — POTASSIUM CHLORIDE 7.45 MG/ML
10 INJECTION INTRAVENOUS
Status: COMPLETED | OUTPATIENT
Start: 2018-10-16 | End: 2018-10-16

## 2018-10-16 RX ADMIN — MORPHINE SULFATE 2 MG: 2 INJECTION, SOLUTION INTRAMUSCULAR; INTRAVENOUS at 05:40

## 2018-10-16 RX ADMIN — POTASSIUM CHLORIDE 10 MEQ: 7.46 INJECTION, SOLUTION INTRAVENOUS at 11:30

## 2018-10-16 RX ADMIN — POTASSIUM CHLORIDE 10 MEQ: 7.46 INJECTION, SOLUTION INTRAVENOUS at 09:55

## 2018-10-16 RX ADMIN — DEXTROSE MONOHYDRATE: 50 INJECTION, SOLUTION INTRAVENOUS at 05:59

## 2018-10-16 RX ADMIN — PROPOFOL 100 MG: 10 INJECTION, EMULSION INTRAVENOUS at 12:33

## 2018-10-16 RX ADMIN — MORPHINE SULFATE 2 MG: 2 INJECTION, SOLUTION INTRAMUSCULAR; INTRAVENOUS at 21:05

## 2018-10-16 RX ADMIN — PIPERACILLIN SODIUM,TAZOBACTAM SODIUM 3.38 G: 3; .375 INJECTION, POWDER, FOR SOLUTION INTRAVENOUS at 05:40

## 2018-10-16 RX ADMIN — PIPERACILLIN SODIUM,TAZOBACTAM SODIUM 3.38 G: 3; .375 INJECTION, POWDER, FOR SOLUTION INTRAVENOUS at 21:13

## 2018-10-16 RX ADMIN — CARVEDILOL 12.5 MG: 6.25 TABLET, FILM COATED ORAL at 16:37

## 2018-10-16 RX ADMIN — INSULIN LISPRO 1 UNITS: 100 INJECTION, SOLUTION INTRAVENOUS; SUBCUTANEOUS at 00:11

## 2018-10-16 RX ADMIN — PANTOPRAZOLE SODIUM 40 MG: 40 INJECTION, POWDER, FOR SOLUTION INTRAVENOUS at 08:18

## 2018-10-16 RX ADMIN — MORPHINE SULFATE 2 MG: 2 INJECTION, SOLUTION INTRAMUSCULAR; INTRAVENOUS at 11:32

## 2018-10-16 RX ADMIN — LEVOTHYROXINE SODIUM ANHYDROUS 87.5 MCG: 100 INJECTION, POWDER, LYOPHILIZED, FOR SOLUTION INTRAVENOUS at 05:41

## 2018-10-16 RX ADMIN — SODIUM CHLORIDE, POTASSIUM CHLORIDE, SODIUM LACTATE AND CALCIUM CHLORIDE: 600; 310; 30; 20 INJECTION, SOLUTION INTRAVENOUS at 12:12

## 2018-10-16 RX ADMIN — POTASSIUM CHLORIDE 10 MEQ: 7.46 INJECTION, SOLUTION INTRAVENOUS at 16:39

## 2018-10-16 RX ADMIN — INSULIN LISPRO 2 UNITS: 100 INJECTION, SOLUTION INTRAVENOUS; SUBCUTANEOUS at 16:34

## 2018-10-16 RX ADMIN — PIPERACILLIN SODIUM,TAZOBACTAM SODIUM 3.38 G: 3; .375 INJECTION, POWDER, FOR SOLUTION INTRAVENOUS at 14:27

## 2018-10-16 RX ADMIN — SODIUM CHLORIDE 500 ML: 9 INJECTION, SOLUTION INTRAVENOUS at 14:26

## 2018-10-16 RX ADMIN — MORPHINE SULFATE 2 MG: 2 INJECTION, SOLUTION INTRAMUSCULAR; INTRAVENOUS at 16:37

## 2018-10-16 RX ADMIN — PANTOPRAZOLE SODIUM 40 MG: 40 INJECTION, POWDER, FOR SOLUTION INTRAVENOUS at 21:09

## 2018-10-16 RX ADMIN — POTASSIUM CHLORIDE 10 MEQ: 7.46 INJECTION, SOLUTION INTRAVENOUS at 15:06

## 2018-10-16 RX ADMIN — MORPHINE SULFATE 2 MG: 2 INJECTION, SOLUTION INTRAMUSCULAR; INTRAVENOUS at 01:36

## 2018-10-16 ASSESSMENT — PAIN SCALES - GENERAL
PAINLEVEL_OUTOF10: 10

## 2018-10-16 ASSESSMENT — PAIN DESCRIPTION - ORIENTATION
ORIENTATION: RIGHT;LEFT
ORIENTATION: RIGHT;LEFT

## 2018-10-16 ASSESSMENT — PAIN DESCRIPTION - FREQUENCY
FREQUENCY: CONTINUOUS
FREQUENCY: CONTINUOUS

## 2018-10-16 ASSESSMENT — PAIN DESCRIPTION - LOCATION
LOCATION: THROAT

## 2018-10-16 ASSESSMENT — PAIN DESCRIPTION - DESCRIPTORS
DESCRIPTORS: DISCOMFORT;ACHING;CONSTANT
DESCRIPTORS: DISCOMFORT;CONSTANT

## 2018-10-16 ASSESSMENT — PAIN DESCRIPTION - ONSET
ONSET: ON-GOING
ONSET: ON-GOING

## 2018-10-16 ASSESSMENT — PAIN DESCRIPTION - PAIN TYPE
TYPE: ACUTE PAIN

## 2018-10-16 ASSESSMENT — ENCOUNTER SYMPTOMS: SHORTNESS OF BREATH: 1

## 2018-10-16 NOTE — PROGRESS NOTES
Hospitalist Progress Note      PCP: SHELBY Thrasher - CNP    Date of Admission: 10/12/2018    Chief Complaint: Shortness of breath with hemoptysis    Hospital Course: Reviewed H&P    Subjective: Still NPO, PEG planned for tomorrow. Will need tube feeds following that. - patient still with episodes of hemoptysis and melena. EGD negative for GI problem. Strongly suspect supraglottic mass as culprit. Continue to hold anti-platelet therapy  - Hgb remains stable, will continue to monitor. Medications:  Reviewed    Infusion Medications    dextrose 50 mL/hr at 10/16/18 0559    dextrose       Scheduled Medications    levothyroxine  87.5 mcg Intravenous Daily    insulin lispro  0-6 Units Subcutaneous 6 times per day    nicotine  1 patch Transdermal Daily    piperacillin-tazobactam  3.375 g Intravenous Q8H    atorvastatin  10 mg Oral Daily    PARoxetine  20 mg Oral Daily    lisinopril  40 mg Oral Daily    carvedilol  12.5 mg Oral BID WC    pantoprazole  40 mg Intravenous BID     PRN Meds: ipratropium-albuterol, morphine, albuterol sulfate HFA, benzonatate, glucose, dextrose, glucagon (rDNA), dextrose, oxyCODONE      Intake/Output Summary (Last 24 hours) at 10/16/18 1503  Last data filed at 10/16/18 0400   Gross per 24 hour   Intake              973 ml   Output                0 ml   Net              973 ml       Physical Exam Performed:  BP (!) 169/98   Pulse 85   Temp 98.5 °F (36.9 °C) (Oral)   Resp 20   Ht 5' 2\" (1.575 m)   Wt 142 lb (64.4 kg)   LMP  (LMP Unknown)   SpO2 92%   BMI 25.97 kg/m²      General appearance: Elderly female in No apparent distress, appears stated age and cooperative. Oxygen by nasal cannula 4 L/m  HEENT:  Normal cephalic, atraumatic without obvious deformity. Pupils equal, round, and reactive to light. Extra ocular muscles intact. Conjunctivae/corneas clear. Pallor  noted  Neck: Supple, with full range of motion. No jugular venous distention.  Trachea midline. Respiratory:  Normal respiratory effort. Diminished breath sounds at bases with fine crackles  Cardiovascular:  Regular rate and rhythm with normal S1/S2 without murmurs, rubs or gallops. Abdomen: Soft, non-tender, non-distended with normal bowel sounds. Musculoskeletal:  No clubbing, cyanosis or edema bilaterally. Full range of motion without deformity. Skin: Skin color, texture, turgor normal.  No rashes or lesions. Neurologic:  Neurovascularly intact without any focal sensory/motor deficits. Cranial nerves: II-XII intact, grossly non-focal.  Psychiatric:  Alert and oriented, thought content appropriate, normal insight  Capillary Refill: Brisk,< 3 seconds   Peripheral Pulses: +2 palpable, equal bilaterally        Labs:   Recent Labs      10/14/18   0528   10/15/18   0549   10/15/18   1603  10/15/18   2346  10/16/18   0606   WBC  11.6*   --   8.5   --    --    --   8.6   HGB  7.8*   < >  7.9*   < >  8.3*  7.6*  8.4*   HCT  24.0*   < >  24.3*   < >  25.6*  23.3*  26.2*   PLT  290   --   259   --    --    --   253    < > = values in this interval not displayed. Recent Labs      10/14/18   0528  10/15/18   0549  10/16/18   0606   NA  144  139  138   K  4.3  3.5  3.3*   CL  106  102  100   CO2  31  29  31   BUN  46*  28*  17   CREATININE  2.0*  1.7*  1.5*   CALCIUM  8.3  8.0*  8.4     No results for input(s): AST, ALT, BILIDIR, BILITOT, ALKPHOS in the last 72 hours. No results for input(s): INR in the last 72 hours. No results for input(s): Marylen Silvan in the last 72 hours.   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 Acute blood loss anemia in the setting of hemoptysis and melanotic stools - probably bleeding from recent biopsy of supraglottic mass at  a week ago prior to this admission   - Holding aspirin and Plavix and monitoring hemoglobin every 8 hours with plans to transfuse for hemoglobin less than 7  - GI consulted, EGD unremarkable except supraglottic mass, PEG tube placement tomorrow. - Pulmonology evaluated and recommended transfer to  for possible ENT evaluation and management of hemoptysis, will evaluate pending EGD and PEG placement. 12.  Severe dysphagia with high risk of aspiration - as per bedside swallow evaluation by SLP on 10/13/2018  - Patient was recommended strict nothing by mouth and to consider alternative means of nutrition .   - Discussed with patient and her son Courtney Ceballos [facility]) regarding PEG tube placement - who are in agreement to proceed  - GI plans on PEG tube placement tomorrow.     DVT Prophylaxis: Lovenox   Diet: Diet NPO, After Midnight  Diet NPO, After Midnight  Code Status: Full Code    PT/OT Eval Status: ordered    Dispo - likely 2-3 days pending clinical improvement and PEG tube placement tomorrow.  Transfer to  for Hemoptysis fom Supraglottic mass evaluation     Juan Fernández MD

## 2018-10-16 NOTE — ANESTHESIA PRE PROCEDURE
ipratropium-albuterol (DUONEB) nebulizer solution 3 mL  3 mL Inhalation Q4H PRN Kathy Becerra MD        dextrose 5 % solution   Intravenous Continuous Beto Peters MD 50 mL/hr at 10/16/18 0559      morphine injection 2 mg  2 mg Intravenous Q4H PRN Beto Peters MD   2 mg at 10/16/18 1132    nicotine (NICODERM CQ) 21 MG/24HR 1 patch  1 patch Transdermal Daily Beto Peters MD   1 patch at 10/16/18 0820    piperacillin-tazobactam (ZOSYN) 3.375 g in dextrose 5 % 50 mL IVPB extended infusion (mini-bag)  3.375 g Intravenous Kelsey French MD   Stopped at 10/16/18 1035    albuterol sulfate  (90 Base) MCG/ACT inhaler 2 puff  2 puff Inhalation Q4H PRN Beto Peters MD        atorvastatin (LIPITOR) tablet 10 mg  10 mg Oral Daily Beto Peters MD   Stopped at 10/15/18 0728    benzonatate (TESSALON) capsule 100 mg  100 mg Oral TID PRN Beto Peters MD        PARoxetine (PAXIL) tablet 20 mg  20 mg Oral Daily Beto Peters MD   Stopped at 10/15/18 0729    glucose (GLUTOSE) 40 % oral gel 15 g  15 g Oral PRN Beto Peters MD        dextrose 50 % solution 12.5 g  12.5 g Intravenous PRN Beto Peters MD        glucagon (rDNA) injection 1 mg  1 mg Intramuscular PRN Beto Peters MD        dextrose 5 % solution  100 mL/hr Intravenous PRN Beto Peters MD        lisinopril (PRINIVIL;ZESTRIL) tablet 40 mg  40 mg Oral Daily Beto Peters MD   Stopped at 10/15/18 0729    oxyCODONE (ROXICODONE) immediate release tablet 5 mg  5 mg Oral BID PRN Beto Peters MD        carvedilol (COREG) tablet 12.5 mg  12.5 mg Oral BID WC Beto Peters MD   Stopped at 10/15/18 0728    pantoprazole (PROTONIX) injection 40 mg  40 mg Intravenous BID Anirudh Leobardo Doe MD   40 mg at 10/16/18 0818       Allergies:     Allergies   Allergen Reactions    Celecoxib Swelling    Codeine Nausea Only    Zoloft [Sertraline Hcl]

## 2018-10-17 ENCOUNTER — ANESTHESIA (OUTPATIENT)
Dept: ENDOSCOPY | Age: 68
DRG: 177 | End: 2018-10-17
Payer: MEDICARE

## 2018-10-17 ENCOUNTER — ANESTHESIA EVENT (OUTPATIENT)
Dept: ENDOSCOPY | Age: 68
DRG: 177 | End: 2018-10-17
Payer: MEDICARE

## 2018-10-17 VITALS — SYSTOLIC BLOOD PRESSURE: 165 MMHG | OXYGEN SATURATION: 100 % | DIASTOLIC BLOOD PRESSURE: 57 MMHG

## 2018-10-17 LAB
ANION GAP SERPL CALCULATED.3IONS-SCNC: 7 MMOL/L (ref 3–16)
BASOPHILS ABSOLUTE: 0.1 K/UL (ref 0–0.2)
BASOPHILS RELATIVE PERCENT: 0.7 %
BLOOD CULTURE, ROUTINE: NORMAL
BUN BLDV-MCNC: 10 MG/DL (ref 7–20)
CALCIUM SERPL-MCNC: 8.2 MG/DL (ref 8.3–10.6)
CHLORIDE BLD-SCNC: 98 MMOL/L (ref 99–110)
CO2: 28 MMOL/L (ref 21–32)
CREAT SERPL-MCNC: 1.5 MG/DL (ref 0.6–1.2)
CULTURE, BLOOD 2: NORMAL
EOSINOPHILS ABSOLUTE: 0 K/UL (ref 0–0.6)
EOSINOPHILS RELATIVE PERCENT: 0.1 %
GFR AFRICAN AMERICAN: 42
GFR NON-AFRICAN AMERICAN: 35
GLUCOSE BLD-MCNC: 102 MG/DL (ref 70–99)
GLUCOSE BLD-MCNC: 114 MG/DL (ref 70–99)
GLUCOSE BLD-MCNC: 116 MG/DL (ref 70–99)
GLUCOSE BLD-MCNC: 118 MG/DL (ref 70–99)
GLUCOSE BLD-MCNC: 120 MG/DL (ref 70–99)
GLUCOSE BLD-MCNC: 125 MG/DL (ref 70–99)
GLUCOSE BLD-MCNC: 133 MG/DL (ref 70–99)
HCT VFR BLD CALC: 25.4 % (ref 36–48)
HCT VFR BLD CALC: 25.9 % (ref 36–48)
HCT VFR BLD CALC: 26.5 % (ref 36–48)
HEMOGLOBIN: 8.3 G/DL (ref 12–16)
HEMOGLOBIN: 8.4 G/DL (ref 12–16)
HEMOGLOBIN: 8.7 G/DL (ref 12–16)
LYMPHOCYTES ABSOLUTE: 1.3 K/UL (ref 1–5.1)
LYMPHOCYTES RELATIVE PERCENT: 15.9 %
MCH RBC QN AUTO: 25.7 PG (ref 26–34)
MCHC RBC AUTO-ENTMCNC: 32.8 G/DL (ref 31–36)
MCV RBC AUTO: 78.3 FL (ref 80–100)
MONOCYTES ABSOLUTE: 0.5 K/UL (ref 0–1.3)
MONOCYTES RELATIVE PERCENT: 6.4 %
NEUTROPHILS ABSOLUTE: 6.3 K/UL (ref 1.7–7.7)
NEUTROPHILS RELATIVE PERCENT: 76.9 %
PDW BLD-RTO: 17 % (ref 12.4–15.4)
PERFORMED ON: ABNORMAL
PLATELET # BLD: 263 K/UL (ref 135–450)
PMV BLD AUTO: 6.7 FL (ref 5–10.5)
POTASSIUM REFLEX MAGNESIUM: 3.6 MMOL/L (ref 3.5–5.1)
RBC # BLD: 3.24 M/UL (ref 4–5.2)
SODIUM BLD-SCNC: 133 MMOL/L (ref 136–145)
TRIGL SERPL-MCNC: 158 MG/DL (ref 0–150)
WBC # BLD: 8.2 K/UL (ref 4–11)

## 2018-10-17 PROCEDURE — C1889 IMPLANT/INSERT DEVICE, NOC: HCPCS | Performed by: INTERNAL MEDICINE

## 2018-10-17 PROCEDURE — 84478 ASSAY OF TRIGLYCERIDES: CPT

## 2018-10-17 PROCEDURE — 2580000003 HC RX 258: Performed by: INTERNAL MEDICINE

## 2018-10-17 PROCEDURE — G8978 MOBILITY CURRENT STATUS: HCPCS

## 2018-10-17 PROCEDURE — 97162 PT EVAL MOD COMPLEX 30 MIN: CPT

## 2018-10-17 PROCEDURE — C9113 INJ PANTOPRAZOLE SODIUM, VIA: HCPCS | Performed by: INTERNAL MEDICINE

## 2018-10-17 PROCEDURE — 6360000002 HC RX W HCPCS: Performed by: NURSE ANESTHETIST, CERTIFIED REGISTERED

## 2018-10-17 PROCEDURE — 6360000002 HC RX W HCPCS: Performed by: INTERNAL MEDICINE

## 2018-10-17 PROCEDURE — 7100000000 HC PACU RECOVERY - FIRST 15 MIN: Performed by: INTERNAL MEDICINE

## 2018-10-17 PROCEDURE — 2709999900 HC NON-CHARGEABLE SUPPLY: Performed by: INTERNAL MEDICINE

## 2018-10-17 PROCEDURE — 2580000003 HC RX 258: Performed by: NURSE ANESTHETIST, CERTIFIED REGISTERED

## 2018-10-17 PROCEDURE — 7100000010 HC PHASE II RECOVERY - FIRST 15 MIN: Performed by: INTERNAL MEDICINE

## 2018-10-17 PROCEDURE — 36415 COLL VENOUS BLD VENIPUNCTURE: CPT

## 2018-10-17 PROCEDURE — G8979 MOBILITY GOAL STATUS: HCPCS

## 2018-10-17 PROCEDURE — 85014 HEMATOCRIT: CPT

## 2018-10-17 PROCEDURE — 85018 HEMOGLOBIN: CPT

## 2018-10-17 PROCEDURE — 0DH63UZ INSERTION OF FEEDING DEVICE INTO STOMACH, PERCUTANEOUS APPROACH: ICD-10-PCS | Performed by: INTERNAL MEDICINE

## 2018-10-17 PROCEDURE — 80048 BASIC METABOLIC PNL TOTAL CA: CPT

## 2018-10-17 PROCEDURE — 3700000000 HC ANESTHESIA ATTENDED CARE: Performed by: INTERNAL MEDICINE

## 2018-10-17 PROCEDURE — 85025 COMPLETE CBC W/AUTO DIFF WBC: CPT

## 2018-10-17 PROCEDURE — 1200000000 HC SEMI PRIVATE

## 2018-10-17 PROCEDURE — 6370000000 HC RX 637 (ALT 250 FOR IP): Performed by: INTERNAL MEDICINE

## 2018-10-17 PROCEDURE — 3700000001 HC ADD 15 MINUTES (ANESTHESIA): Performed by: INTERNAL MEDICINE

## 2018-10-17 PROCEDURE — 3609013300 HC EGD TUBE PLACEMENT: Performed by: INTERNAL MEDICINE

## 2018-10-17 PROCEDURE — 2500000003 HC RX 250 WO HCPCS: Performed by: NURSE PRACTITIONER

## 2018-10-17 RX ORDER — SODIUM CHLORIDE 9 MG/ML
INJECTION, SOLUTION INTRAVENOUS CONTINUOUS
Status: DISCONTINUED | OUTPATIENT
Start: 2018-10-17 | End: 2018-10-21

## 2018-10-17 RX ORDER — SODIUM CHLORIDE 9 MG/ML
INJECTION, SOLUTION INTRAVENOUS
Status: DISPENSED
Start: 2018-10-17 | End: 2018-10-18

## 2018-10-17 RX ORDER — MORPHINE SULFATE 2 MG/ML
2 INJECTION, SOLUTION INTRAMUSCULAR; INTRAVENOUS
Status: DISCONTINUED | OUTPATIENT
Start: 2018-10-17 | End: 2018-10-22 | Stop reason: HOSPADM

## 2018-10-17 RX ORDER — SODIUM CHLORIDE, SODIUM LACTATE, POTASSIUM CHLORIDE, CALCIUM CHLORIDE 600; 310; 30; 20 MG/100ML; MG/100ML; MG/100ML; MG/100ML
INJECTION, SOLUTION INTRAVENOUS CONTINUOUS PRN
Status: DISCONTINUED | OUTPATIENT
Start: 2018-10-17 | End: 2018-10-17 | Stop reason: SDUPTHER

## 2018-10-17 RX ORDER — PROPOFOL 10 MG/ML
INJECTION, EMULSION INTRAVENOUS PRN
Status: DISCONTINUED | OUTPATIENT
Start: 2018-10-17 | End: 2018-10-17 | Stop reason: SDUPTHER

## 2018-10-17 RX ORDER — GLIMEPIRIDE 4 MG/1
TABLET ORAL
Qty: 180 TABLET | Refills: 2 | Status: ON HOLD | OUTPATIENT
Start: 2018-10-17 | End: 2019-01-04 | Stop reason: HOSPADM

## 2018-10-17 RX ORDER — CARVEDILOL 6.25 MG/1
TABLET ORAL
Qty: 180 TABLET | Refills: 2 | Status: ON HOLD | OUTPATIENT
Start: 2018-10-17 | End: 2019-01-04 | Stop reason: HOSPADM

## 2018-10-17 RX ADMIN — LEVOTHYROXINE SODIUM ANHYDROUS 87.5 MCG: 100 INJECTION, POWDER, LYOPHILIZED, FOR SOLUTION INTRAVENOUS at 05:48

## 2018-10-17 RX ADMIN — SODIUM CHLORIDE, POTASSIUM CHLORIDE, SODIUM LACTATE AND CALCIUM CHLORIDE: 600; 310; 30; 20 INJECTION, SOLUTION INTRAVENOUS at 11:15

## 2018-10-17 RX ADMIN — PIPERACILLIN SODIUM,TAZOBACTAM SODIUM 3.38 G: 3; .375 INJECTION, POWDER, FOR SOLUTION INTRAVENOUS at 14:11

## 2018-10-17 RX ADMIN — SODIUM CHLORIDE: 9 INJECTION, SOLUTION INTRAVENOUS at 16:48

## 2018-10-17 RX ADMIN — MORPHINE SULFATE 2 MG: 2 INJECTION, SOLUTION INTRAMUSCULAR; INTRAVENOUS at 12:38

## 2018-10-17 RX ADMIN — PANTOPRAZOLE SODIUM 40 MG: 40 INJECTION, POWDER, FOR SOLUTION INTRAVENOUS at 20:53

## 2018-10-17 RX ADMIN — MORPHINE SULFATE 2 MG: 2 INJECTION, SOLUTION INTRAMUSCULAR; INTRAVENOUS at 22:33

## 2018-10-17 RX ADMIN — PROPOFOL 50 MG: 10 INJECTION, EMULSION INTRAVENOUS at 11:35

## 2018-10-17 RX ADMIN — MORPHINE SULFATE 2 MG: 2 INJECTION, SOLUTION INTRAMUSCULAR; INTRAVENOUS at 08:54

## 2018-10-17 RX ADMIN — PROPOFOL 50 MG: 10 INJECTION, EMULSION INTRAVENOUS at 11:38

## 2018-10-17 RX ADMIN — PIPERACILLIN SODIUM,TAZOBACTAM SODIUM 3.38 G: 3; .375 INJECTION, POWDER, FOR SOLUTION INTRAVENOUS at 20:53

## 2018-10-17 RX ADMIN — PANTOPRAZOLE SODIUM 40 MG: 40 INJECTION, POWDER, FOR SOLUTION INTRAVENOUS at 08:47

## 2018-10-17 RX ADMIN — PIPERACILLIN SODIUM,TAZOBACTAM SODIUM 3.38 G: 3; .375 INJECTION, POWDER, FOR SOLUTION INTRAVENOUS at 05:57

## 2018-10-17 RX ADMIN — PROPOFOL 50 MG: 10 INJECTION, EMULSION INTRAVENOUS at 11:33

## 2018-10-17 RX ADMIN — MORPHINE SULFATE 2 MG: 2 INJECTION, SOLUTION INTRAMUSCULAR; INTRAVENOUS at 19:23

## 2018-10-17 RX ADMIN — MORPHINE SULFATE 2 MG: 2 INJECTION, SOLUTION INTRAMUSCULAR; INTRAVENOUS at 15:26

## 2018-10-17 RX ADMIN — PROPOFOL 100 MG: 10 INJECTION, EMULSION INTRAVENOUS at 11:30

## 2018-10-17 ASSESSMENT — PAIN DESCRIPTION - ORIENTATION: ORIENTATION: RIGHT;LEFT

## 2018-10-17 ASSESSMENT — PAIN SCALES - GENERAL
PAINLEVEL_OUTOF10: 10
PAINLEVEL_OUTOF10: 10
PAINLEVEL_OUTOF10: 7
PAINLEVEL_OUTOF10: 5
PAINLEVEL_OUTOF10: 10
PAINLEVEL_OUTOF10: 8

## 2018-10-17 ASSESSMENT — PAIN DESCRIPTION - DESCRIPTORS: DESCRIPTORS: ACHING

## 2018-10-17 ASSESSMENT — PAIN DESCRIPTION - LOCATION: LOCATION: ABDOMEN

## 2018-10-17 ASSESSMENT — ENCOUNTER SYMPTOMS: SHORTNESS OF BREATH: 1

## 2018-10-17 ASSESSMENT — PAIN DESCRIPTION - PAIN TYPE: TYPE: ACUTE PAIN

## 2018-10-17 NOTE — CONSULTS
Nutrition Assessment    Type and Reason for Visit: Consult    Nutrition Recommendations:   1. NPO for 24 hrs s/p PEG per GI. When appropriate, Recommend order \"Diet: Tube feed continuous/ NPO\". Initiate Glucerna 1.5 (Diabetic 1.5 formula) at 20 mL/hr and as tolerated, increase by 20 mL/hr q 4 hours until goal of 60 mL/hr is met via PEG. 2. When IVF discontinued, Recommend 100 mL H20 q 3 hours. Recommend reevaluate IV fluids at this time. Increase flush if Na increases greater than 145 mEq/L.  3. Ensure head of bed is 30 - 45 degrees during continuous or bolus gastric feeding   4. Monitor for tolerance (bowel habits, N/V, cramping). 5. Monitor nutrition adequacy, pertinent labs, bowel habits, wt changes, and clinical progress    Malnutrition Assessment:  · Malnutrition Status: At risk for malnutrition    Nutrition Diagnosis:   · Problem: Inadequate energy intake  · Etiology: related to Insufficient energy/nutrient consumption, Difficulty swallowing     Signs and symptoms:  as evidenced by NPO status due to medical condition, Swallow study results    Nutrition Assessment:  · Subjective Assessment: Consult for TF ordering and mgmt. Pt is s/p SLP evaluation recommending strict NPO status and alternate means of nutrition. GI consulted. S/p PEG this am. To remain NPO for 24 hrs per MD and not able to use PEG today. Will provide TF recommendations in note. · Current Nutrition Therapies:  · Oral Diet Orders: NPO   · Oral Diet intake: NPO  · Tube Feeding (TF) Orders:   · Feeding Route: Gastrostomy  · Formula: Diabetic (Glucerna 1.5)  · Duration: Continuous 24hrs  · Goal TF & Flush Orders Provides: When appropriate for TF, recommend Glucerna 1.5 (Diabetic 1.5) at goal of 60 mL/hr x 20 hrs to provide 1200 mL TV, 1800 kcals, 99 gm protien, and 911 mL free fluid. Recommend 100 mL free water q 3 hrs to provide total of 1711 mL free fluid with flushes and feeds.    · Anthropometric Measures:  · Ht: 5' 2\" (157.5 cm)

## 2018-10-17 NOTE — OP NOTE
Percutaneous Endoscopic Gastrostomy Note    Patient:   Grace Calderón   YOB: 1950   Facility:   [facility] [Inpatient]   Referring/PCP: SHELBY Christianson CNP  Procedure:   Esophagogastroduodenoscopy with placement of a percutaneous endoscopic gastrostomy tube  Date:     10/17/2018   Endoscopist:  Lucina Howe     Preoperative Diagnosis:  Feeding Difficulties    Postoperative Diagnosis:  Feeding Difficulties    Preprocedure medications: Is on Zosyn q 8 hrs. Anesthesia: Propofol. Estimated blood loss: None  Complications: None  Description of Procedure:  Informed consent was obtained from the patient after explanation of the procedure including indications, description of the procedure,  benefits and possible risks and complications of the procedure, and alternatives. Questions were answered. The patient's history was reviewed and a directed physical examination was performed prior to the procedure. Patient recieved prophylactic antibiotics immediately prior to the start of the procedure and was monitored throughout the procedure with pulse oximetry and periodic assessment of vital signs. Patient was sedated as noted above. The Nursing staff and I performed a time out. With the patient in supine position, Olympus  flexible endoscope was introduced and passed without difficulty. Visualization of the esophagus, stomach, and duodenum was performed and retroflexed view of the proximal stomach was obtained. The scope was passed to the antrum. Esophagus: normal.  Stomach: normal  No contraindication to placement of the gastrostomy tube was appreciated. The site for placement of the gastrostomy tube was identified with palpation and   transillumination of the abdomen. The abdomen, having been prepared, was draped in a sterile fashion. Local anesthesia was provided with  2 mL of 1% Xylocaine. A nick was made in the skin with a #11 scalpel blade.  Angiocath was introduced through the anterior abdominal wall. The needle was withdrawn and an insertion wire introduced. This was grasped with a snare and withdrawn through the patient's mouth with withdrawal of the endoscope. A 20-Colombian Ponsky-type gastrostomy tube was affixed to the wire and traction applied to the later. The tube was delivered through the anterior abdominal wall and a bolster placed at 3 cm. The endoscope was not reintroduced to confirm appropriate placement of the PEG. Dry sterile dressing was applied and an abdominal binder was placed. Findings:  -Successful PEG tube placement.     Recommendations: -NPO for 24 hrs    Lucina Howe MD       (O) [phone]          Lucina Howe MD

## 2018-10-17 NOTE — H&P
Pre-sedation Assessment    History and Physical / Pre-Sedation Assessment  Patient:  Lianna Higgins   :   1950     Intended Procedure: PEG      HPI: 71yo F with COPD, CHF, DM, CVA and a suspected neoplastic supraglottic lesion who is admitted with dyspnea and noted to have worsening anemia and melena. She could possibly have bled from her lesion or from a PUD. It appears that she presents a very high risk of aspiration as determined by SLP bedside eval. EGD neg. - Will do a PEG      Nurses notes reviewed and agreed. Medications reviewed  Allergies: Allergies   Allergen Reactions    Celecoxib Swelling    Codeine Nausea Only    Zoloft [Sertraline Hcl] Diarrhea           Physical Exam:  Vital Signs: BP (!) 184/97   Pulse 92   Temp 99.6 °F (37.6 °C)   Resp 18   Ht 5' 2\" (1.575 m)   Wt 142 lb (64.4 kg)   LMP  (LMP Unknown)   SpO2 95%   BMI 25.97 kg/m²  Body mass index is 25.97 kg/m². Airway:Normal  Cardiac:Normal  Pulmonary:Normal  Abdomen:Normal  Specific to procedure: none      Pre-Procedure Assessment/Plan:  ASA 3 - Patient with moderate systemic disease with functional limitations  Malampati class II  Level of Sedation Plan:Deep sedation    Post Procedure plan: Return to same level of care    I assessed the patient and find that the patient is in satisfactory condition to proceed with the planned procedure and sedation plan. I have explained the risk, benefits, and alternatives to the procedure. The patient understands and agrees to proceed.   Yes    Waldo Renae MD       (O) [phone]        Blythe Romance  11:26 AM 10/17/2018

## 2018-10-17 NOTE — PROGRESS NOTES
Status: WFL  Bed mobility  Comment: declined all movement outside of supine ther ex  Transfers  Comment: declined all movement outside of supine ther ex  Ambulation  Ambulation?: No (declined all movement outside of supine ther ex)     Balance  Comments: declined all movement outside of supine ther ex  Exercises  Comments: reviewed supine B LE ther ex: AP. QS, GS, heel slides, hip abd/add     Assessment   Body structures, Functions, Activity limitations: Decreased functional mobility ; Decreased ROM; Decreased strength;Decreased balance;Decreased endurance  Assessment: 80year old female presents with poor korey to evaluation this date outside of supine LE assessment due to c/o 10/10 abdominal pain. Pt had PEG placement this am. Will continue to assess discharge needs and recommendations based on completion of functional mobility assessment. Treatment Diagnosis: weakness  Specific instructions for Next Treatment: assess out of bed mob including transfer and gait as able with least restrictive AD  Prognosis: Good  Decision Making: Medium Complexity  Barriers to Learning: pain  REQUIRES PT FOLLOW UP: Yes  Activity Tolerance  Activity Tolerance: Patient limited by fatigue;Patient limited by pain         Plan   Plan  Times per week: 3-5 x  Times per day: Daily  Plan weeks: 1  Specific instructions for Next Treatment: assess out of bed mob including transfer and gait as able with least restrictive AD  Current Treatment Recommendations: Strengthening, ROM, Balance Training, Transfer Training, Endurance Training, Gait Training, Stair training, Neuromuscular Re-education, Home Exercise Program, Safety Education & Training, Pain Management, Patient/Caregiver Education & Training  Safety Devices  Type of devices:  All fall risk precautions in place, Left in bed    G-Code  PT G-Codes  Functional Assessment Tool Used: AM-PAC  Score: 100  Functional Limitation: Mobility: Walking and moving around  Mobility: Walking and Moving Around

## 2018-10-17 NOTE — ANESTHESIA PRE PROCEDURE
 Diabetic polyneuropathy (HCC) E11.42    Hypothyroidism E03.9    Left carotid stenosis I65.22    Hyponatremia E87.1    Anxiety F41.9    Acute respiratory failure with hypoxia and hypercapnia (HCC) J96.01, J96.02    Thrombocytosis (HCC) D47.3    Elevated brain natriuretic peptide (BNP) level R79.89    Hypochloremia E87.8    CKD (chronic kidney disease) stage 3, GFR 30-59 ml/min (HCC) N18.3    SIRS (systemic inflammatory response syndrome) ([facility]) R65.10    Syncope and collapse R55    Head trauma S09.90XA    Nausea, vomiting, and diarrhea R11.2, R19.7    Pleural effusion, left J90    Tobacco smoker Z72.0    R medial malleolus fracture S82.53XA    R distal fibula fracture S82.831A    R great toe fracture S92.491A    QTc-prolongation R94.31    Chronic combined systolic and diastolic congestive heart failure (HCC) I50.42    Diarrhea R19.7    Closed fracture of right ankle S82.891A    Liver disease K76.9    Anemia D64.9    Chest pain R07.9    SOB (shortness of breath) R06.02    Acute on chronic respiratory failure with hypoxia (HCC) J96.21    Hemoptysis R04.2    Neuroendocrine neoplasm of larynx D3A.8    Chronic respiratory failure with hypoxia and hypercapnia (HCC) J96.11, J96.12    Anemia due to blood loss D50.0       Past Medical History:        Diagnosis Date    Acute on chronic congestive heart failure (HCC)     Arthritis     Carotid stenosis     right    Chronic back pain     DDD (degenerative disc disease) lumbar    Diabetes mellitus [facility] [address]     Hyperlipidemia     Hypertension     Neuropathy     Pneumonia     Stroke ([facility] [address] 7/1/2013    Throat cancer ([facility] [address]     Thyroid disease     hypothyroidism       Past Surgical History:        Procedure Laterality Date    BACK SURGERY      COLONOSCOPY  01/09/2018    polyp removal    EYE SURGERY Right 2/22/2013    cataract removal    NM ESOPHAGOGASTRODUODENOSCOPY TRANSORAL DIAGNOSTIC N/A 10/16/2018    EGD DIAGNOSTIC ONLY performed by 133*       Coags:   Lab Results   Component Value Date    PROTIME 11.3 10/13/2018    INR 0.99 10/13/2018    APTT 29.0 05/03/2018       HCG (If Applicable): No results found for: PREGTESTUR, PREGSERUM, HCG, HCGQUANT     ABGs:   Lab Results   Component Value Date    PHART 7.389 10/13/2018    PO2ART 67.1 10/13/2018    EHT3VNN 48.6 10/13/2018    PIZ2UBR 28.7 10/13/2018    BEART 3.2 10/13/2018    J9KFUAOK 91.9 10/13/2018        Type & Screen (If Applicable):  No results found for: LABABO, [address]    Anesthesia Evaluation  Patient summary reviewed no history of anesthetic complications:   Airway: Mallampati: II  TM distance: >3 FB   Neck ROM: full  Mouth opening: > = 3 FB Dental:    (+) edentulous      Pulmonary:   (+) pneumonia:  shortness of breath:                             Cardiovascular:    (+) hypertension:, CHF: systolic and diastolic,                   Neuro/Psych:   (+) CVA:, neuromuscular disease:,             GI/Hepatic/Renal:   (+) liver disease:, renal disease: CRI,           Endo/Other:    (+) DiabetesType II DM, using insulin, hypothyroidism::., .                 Abdominal:           Vascular: negative vascular ROS. Anesthesia Plan      TIVA     ASA 3       Induction: intravenous. Anesthetic plan and risks discussed with patient. Plan discussed with CRNA. All questions answered and agrees with plan.         Mike Elder MD   10/17/2018

## 2018-10-17 NOTE — PROGRESS NOTES
Hospitalist Progress Note      PCP: SHELBY Zapata - CNP    Date of Admission: 10/12/2018    Chief Complaint: Shortness of breath with hemoptysis    Hospital Course: Reviewed H&P    Subjective: Still NPO, PEG placed today. Nutrition consulted. Will need TF once able to use. - patient still with episodes of hemoptysis and melena. EGD negative for GI problem. Strongly suspect supraglottic mass as culprit. Continue to hold anti-platelet therapy  - Hgb remains stable, will continue to monitor. Medications:  Reviewed    Infusion Medications    sodium chloride      dextrose 50 mL/hr at 10/16/18 0559    dextrose       Scheduled Medications    levothyroxine  87.5 mcg Intravenous Daily    insulin lispro  0-6 Units Subcutaneous 6 times per day    nicotine  1 patch Transdermal Daily    piperacillin-tazobactam  3.375 g Intravenous Q8H    atorvastatin  10 mg Oral Daily    PARoxetine  20 mg Oral Daily    lisinopril  40 mg Oral Daily    carvedilol  12.5 mg Oral BID WC    pantoprazole  40 mg Intravenous BID     PRN Meds: ipratropium-albuterol, morphine, albuterol sulfate HFA, benzonatate, glucose, dextrose, glucagon (rDNA), dextrose, oxyCODONE      Intake/Output Summary (Last 24 hours) at 10/17/18 1414  Last data filed at 10/17/18 1140   Gross per 24 hour   Intake              400 ml   Output                0 ml   Net              400 ml       Physical Exam Performed:  BP (!) 184/97   Pulse 84   Temp 97 °F (36.1 °C) (Oral)   Resp 16   Ht 5' 2\" (1.575 m)   Wt 142 lb (64.4 kg)   LMP  (LMP Unknown)   SpO2 94%   BMI 25.97 kg/m²      General appearance: Elderly female in No apparent distress, appears stated age and cooperative. HEENT:  Normal cephalic, atraumatic without obvious deformity. Pupils equal, round, and reactive to light. Extra ocular muscles intact. Conjunctivae/corneas clear. Pallor  noted  Neck: Supple, with full range of motion. No jugular venous distention.  Trachea 05/12/2018    Chronic combined systolic and diastolic congestive heart failure ([facility]) [I50.42]     CKD (chronic kidney disease) stage 3, GFR 30-59 ml/min ([facility]) [N18.3] 04/24/2018    Tobacco smoker [Z72.0] 04/24/2018     1. Acute on chronic respiratory failure with hypoxia - Patient supposed  to be on 2 L/m oxygen per nasal cannula but she is noncompliant and currently requiring 4 L/m oxygen by nasal cannula. Wean oxygen as able to home requirement with a goal SPO2 of more than 90%  - Consulted Pulmonology  - on zosyn, currently on home O2     2. Possible left-sided pneumonia with pleural effusion - check sputum culture, swallow evaluation on 10/13/18- S/o Patient high risk for aspiration, received Rocephin and azithromycin ×1 in ED-continue pending blood and sputum cultures. Continue home inhalers  - On Zosyn per pulmonology     3. Hypertension - optimal control on home medications - continue      4. Type 2 diabetes mellitus - last A1c on 8/13/2018 8.8%. Currently NPO with D5, q4h SSI ordered.     5. Hypothyroidism - continue home dose of Synthroid     6. Hemoptysis in the  setting of Recent Biopsy of   supraglottic mass - patient had biopsy of supraglottic mass at  a week ago ; results pending - advised outpatient follow-up  - Hold Home dose of Aspirin and Plavix for now and monitor Hb      7. HLD - continue statin     8. CKD stage III - Baseline Cr ~ 1.8 . Monitor BMP . Avoid Nephrotoxins      9. Elevated troponin in the setting of CKD - patient currently is chest pain-free      10. Acute on chronic Systolic CHF - partly contributing to present hypoxia ; last echo dated 4/25/2018 suggestive of LVEF 45-50% with akinesis of basal inferior wall; grade 2 diastolic dysfunction with severely dilated left atrium.   - Checked  proBNP - 1978 - Received  20 mg IV Lasix x1  and monitor I and O's and consider IV Lasix as needed.    - Patient not on any diuretics at home.  - Repeat Echo on 10/13/18 with normal EF 65%; no regional wall motion abnormalities  and trended Troponin - Negative     11. Acute blood loss anemia in the setting of hemoptysis and melanotic stools - probably bleeding from recent biopsy of supraglottic mass at  a week ago prior to this admission   - Holding aspirin and Plavix and monitoring hemoglobin every 8 hours with plans to transfuse for hemoglobin less than 7  - GI consulted, EGD unremarkable except supraglottic mass, PEG tube placement tomorrow. - Pulmonology evaluated and recommended transfer to  for possible ENT evaluation and management of hemoptysis. Will initiate possible transfer following starting of tube feedings. 12.  Severe dysphagia with high risk of aspiration - as per bedside swallow evaluation by SLP on 10/13/2018  - Patient was recommended strict nothing by mouth and to consider alternative means of nutrition .   - Discussed with patient and her son Misty Keene [facility]) regarding PEG tube placement - who are in agreement to proceed  - PEG placed today, will start TF as able. Will start transition to PO meds.     DVT Prophylaxis: Lovenox   Diet: Diet NPO Effective Now  Code Status: Full Code    PT/OT Eval Status: ordered    Dispo - likely tomorrow.  Transfer to  for Hemoptysis for Supraglottic mass evaluation     Tricia Jacobson MD

## 2018-10-18 LAB
A/G RATIO: 0.7 (ref 1.1–2.2)
ALBUMIN SERPL-MCNC: 2.3 G/DL (ref 3.4–5)
ALP BLD-CCNC: 58 U/L (ref 40–129)
ALT SERPL-CCNC: <5 U/L (ref 10–40)
ANION GAP SERPL CALCULATED.3IONS-SCNC: 14 MMOL/L (ref 3–16)
AST SERPL-CCNC: 8 U/L (ref 15–37)
BASOPHILS ABSOLUTE: 0.1 K/UL (ref 0–0.2)
BASOPHILS RELATIVE PERCENT: 1 %
BILIRUB SERPL-MCNC: 0.3 MG/DL (ref 0–1)
BUN BLDV-MCNC: 9 MG/DL (ref 7–20)
CALCIUM SERPL-MCNC: 8.4 MG/DL (ref 8.3–10.6)
CHLORIDE BLD-SCNC: 104 MMOL/L (ref 99–110)
CO2: 25 MMOL/L (ref 21–32)
CREAT SERPL-MCNC: 1.6 MG/DL (ref 0.6–1.2)
EOSINOPHILS ABSOLUTE: 0 K/UL (ref 0–0.6)
EOSINOPHILS RELATIVE PERCENT: 0.1 %
GFR AFRICAN AMERICAN: 39
GFR NON-AFRICAN AMERICAN: 32
GLOBULIN: 3.2 G/DL
GLUCOSE BLD-MCNC: 103 MG/DL (ref 70–99)
GLUCOSE BLD-MCNC: 123 MG/DL (ref 70–99)
GLUCOSE BLD-MCNC: 130 MG/DL (ref 70–99)
GLUCOSE BLD-MCNC: 176 MG/DL (ref 70–99)
GLUCOSE BLD-MCNC: 91 MG/DL (ref 70–99)
GLUCOSE BLD-MCNC: 94 MG/DL (ref 70–99)
GLUCOSE BLD-MCNC: 96 MG/DL (ref 70–99)
HCT VFR BLD CALC: 26.9 % (ref 36–48)
HCT VFR BLD CALC: 27.5 % (ref 36–48)
HCT VFR BLD CALC: 29.7 % (ref 36–48)
HEMOGLOBIN: 8.7 G/DL (ref 12–16)
HEMOGLOBIN: 8.8 G/DL (ref 12–16)
HEMOGLOBIN: 9.5 G/DL (ref 12–16)
LYMPHOCYTES ABSOLUTE: 1.3 K/UL (ref 1–5.1)
LYMPHOCYTES RELATIVE PERCENT: 16.8 %
MAGNESIUM: 1.7 MG/DL (ref 1.8–2.4)
MCH RBC QN AUTO: 25.2 PG (ref 26–34)
MCHC RBC AUTO-ENTMCNC: 32.3 G/DL (ref 31–36)
MCV RBC AUTO: 78 FL (ref 80–100)
MONOCYTES ABSOLUTE: 0.5 K/UL (ref 0–1.3)
MONOCYTES RELATIVE PERCENT: 6.8 %
NEUTROPHILS ABSOLUTE: 5.7 K/UL (ref 1.7–7.7)
NEUTROPHILS RELATIVE PERCENT: 75.3 %
PDW BLD-RTO: 16.8 % (ref 12.4–15.4)
PERFORMED ON: ABNORMAL
PERFORMED ON: NORMAL
PERFORMED ON: NORMAL
PHOSPHORUS: 3.2 MG/DL (ref 2.5–4.9)
PLATELET # BLD: 292 K/UL (ref 135–450)
PMV BLD AUTO: 7 FL (ref 5–10.5)
POTASSIUM REFLEX MAGNESIUM: 3.2 MMOL/L (ref 3.5–5.1)
POTASSIUM SERPL-SCNC: 3.2 MMOL/L (ref 3.5–5.1)
RBC # BLD: 3.45 M/UL (ref 4–5.2)
SODIUM BLD-SCNC: 143 MMOL/L (ref 136–145)
TOTAL PROTEIN: 5.5 G/DL (ref 6.4–8.2)
WBC # BLD: 7.5 K/UL (ref 4–11)

## 2018-10-18 PROCEDURE — 84100 ASSAY OF PHOSPHORUS: CPT

## 2018-10-18 PROCEDURE — 1200000000 HC SEMI PRIVATE

## 2018-10-18 PROCEDURE — G8988 SELF CARE GOAL STATUS: HCPCS

## 2018-10-18 PROCEDURE — 85014 HEMATOCRIT: CPT

## 2018-10-18 PROCEDURE — 85025 COMPLETE CBC W/AUTO DIFF WBC: CPT

## 2018-10-18 PROCEDURE — 80053 COMPREHEN METABOLIC PANEL: CPT

## 2018-10-18 PROCEDURE — 2580000003 HC RX 258: Performed by: INTERNAL MEDICINE

## 2018-10-18 PROCEDURE — 2500000003 HC RX 250 WO HCPCS: Performed by: INTERNAL MEDICINE

## 2018-10-18 PROCEDURE — G8987 SELF CARE CURRENT STATUS: HCPCS

## 2018-10-18 PROCEDURE — 6360000002 HC RX W HCPCS: Performed by: NURSE PRACTITIONER

## 2018-10-18 PROCEDURE — 6370000000 HC RX 637 (ALT 250 FOR IP): Performed by: INTERNAL MEDICINE

## 2018-10-18 PROCEDURE — 94761 N-INVAS EAR/PLS OXIMETRY MLT: CPT

## 2018-10-18 PROCEDURE — 6360000002 HC RX W HCPCS: Performed by: INTERNAL MEDICINE

## 2018-10-18 PROCEDURE — 83735 ASSAY OF MAGNESIUM: CPT

## 2018-10-18 PROCEDURE — 2500000003 HC RX 250 WO HCPCS: Performed by: NURSE PRACTITIONER

## 2018-10-18 PROCEDURE — 85018 HEMOGLOBIN: CPT

## 2018-10-18 PROCEDURE — 97535 SELF CARE MNGMENT TRAINING: CPT

## 2018-10-18 PROCEDURE — 2700000000 HC OXYGEN THERAPY PER DAY

## 2018-10-18 PROCEDURE — 36415 COLL VENOUS BLD VENIPUNCTURE: CPT

## 2018-10-18 PROCEDURE — C9113 INJ PANTOPRAZOLE SODIUM, VIA: HCPCS | Performed by: INTERNAL MEDICINE

## 2018-10-18 PROCEDURE — 97166 OT EVAL MOD COMPLEX 45 MIN: CPT

## 2018-10-18 RX ORDER — POTASSIUM CHLORIDE 7.45 MG/ML
10 INJECTION INTRAVENOUS
Status: COMPLETED | OUTPATIENT
Start: 2018-10-18 | End: 2018-10-18

## 2018-10-18 RX ORDER — SODIUM CHLORIDE 0.9 % (FLUSH) 0.9 %
10 SYRINGE (ML) INJECTION PRN
Status: DISCONTINUED | OUTPATIENT
Start: 2018-10-18 | End: 2018-10-22 | Stop reason: HOSPADM

## 2018-10-18 RX ORDER — HYDRALAZINE HYDROCHLORIDE 20 MG/ML
10 INJECTION INTRAMUSCULAR; INTRAVENOUS EVERY 6 HOURS PRN
Status: DISCONTINUED | OUTPATIENT
Start: 2018-10-18 | End: 2018-10-22 | Stop reason: HOSPADM

## 2018-10-18 RX ORDER — SODIUM CHLORIDE 0.9 % (FLUSH) 0.9 %
10 SYRINGE (ML) INJECTION EVERY 12 HOURS SCHEDULED
Status: DISCONTINUED | OUTPATIENT
Start: 2018-10-18 | End: 2018-10-22 | Stop reason: HOSPADM

## 2018-10-18 RX ORDER — LABETALOL HYDROCHLORIDE 5 MG/ML
10 INJECTION, SOLUTION INTRAVENOUS ONCE
Status: COMPLETED | OUTPATIENT
Start: 2018-10-18 | End: 2018-10-18

## 2018-10-18 RX ORDER — SODIUM CHLORIDE 9 MG/ML
INJECTION, SOLUTION INTRAVENOUS
Status: DISPENSED
Start: 2018-10-18 | End: 2018-10-18

## 2018-10-18 RX ORDER — MAGNESIUM SULFATE 1 G/100ML
1 INJECTION INTRAVENOUS ONCE
Status: COMPLETED | OUTPATIENT
Start: 2018-10-18 | End: 2018-10-18

## 2018-10-18 RX ADMIN — POTASSIUM CHLORIDE 10 MEQ: 7.46 INJECTION, SOLUTION INTRAVENOUS at 15:42

## 2018-10-18 RX ADMIN — POTASSIUM CHLORIDE 10 MEQ: 7.46 INJECTION, SOLUTION INTRAVENOUS at 11:36

## 2018-10-18 RX ADMIN — PANTOPRAZOLE SODIUM 40 MG: 40 INJECTION, POWDER, FOR SOLUTION INTRAVENOUS at 20:55

## 2018-10-18 RX ADMIN — LABETALOL HYDROCHLORIDE 10 MG: 5 INJECTION, SOLUTION INTRAVENOUS at 09:37

## 2018-10-18 RX ADMIN — CARVEDILOL 12.5 MG: 6.25 TABLET, FILM COATED ORAL at 18:34

## 2018-10-18 RX ADMIN — PIPERACILLIN SODIUM,TAZOBACTAM SODIUM 3.38 G: 3; .375 INJECTION, POWDER, FOR SOLUTION INTRAVENOUS at 04:54

## 2018-10-18 RX ADMIN — POTASSIUM CHLORIDE 10 MEQ: 7.46 INJECTION, SOLUTION INTRAVENOUS at 12:51

## 2018-10-18 RX ADMIN — POTASSIUM CHLORIDE 10 MEQ: 7.46 INJECTION, SOLUTION INTRAVENOUS at 14:36

## 2018-10-18 RX ADMIN — HYDRALAZINE HYDROCHLORIDE 10 MG: 20 INJECTION INTRAMUSCULAR; INTRAVENOUS at 22:16

## 2018-10-18 RX ADMIN — OXYCODONE HYDROCHLORIDE 5 MG: 5 TABLET ORAL at 15:12

## 2018-10-18 RX ADMIN — PIPERACILLIN SODIUM,TAZOBACTAM SODIUM 3.38 G: 3; .375 INJECTION, POWDER, FOR SOLUTION INTRAVENOUS at 12:50

## 2018-10-18 RX ADMIN — PIPERACILLIN SODIUM,TAZOBACTAM SODIUM 3.38 G: 3; .375 INJECTION, POWDER, FOR SOLUTION INTRAVENOUS at 20:56

## 2018-10-18 RX ADMIN — MAGNESIUM SULFATE HEPTAHYDRATE 1 G: 1 INJECTION, SOLUTION INTRAVENOUS at 09:37

## 2018-10-18 RX ADMIN — PAROXETINE HYDROCHLORIDE 20 MG: 20 TABLET, FILM COATED ORAL at 12:02

## 2018-10-18 RX ADMIN — LISINOPRIL 40 MG: 20 TABLET ORAL at 12:02

## 2018-10-18 RX ADMIN — MORPHINE SULFATE 2 MG: 2 INJECTION, SOLUTION INTRAMUSCULAR; INTRAVENOUS at 04:58

## 2018-10-18 RX ADMIN — LEVOTHYROXINE SODIUM ANHYDROUS 87.5 MCG: 100 INJECTION, POWDER, LYOPHILIZED, FOR SOLUTION INTRAVENOUS at 06:36

## 2018-10-18 RX ADMIN — INSULIN LISPRO 1 UNITS: 100 INJECTION, SOLUTION INTRAVENOUS; SUBCUTANEOUS at 20:56

## 2018-10-18 RX ADMIN — ATORVASTATIN CALCIUM 10 MG: 10 TABLET, FILM COATED ORAL at 12:04

## 2018-10-18 RX ADMIN — SODIUM CHLORIDE, PRESERVATIVE FREE 10 ML: 5 INJECTION INTRAVENOUS at 20:58

## 2018-10-18 RX ADMIN — PANTOPRAZOLE SODIUM 40 MG: 40 INJECTION, POWDER, FOR SOLUTION INTRAVENOUS at 12:02

## 2018-10-18 ASSESSMENT — PAIN SCALES - GENERAL
PAINLEVEL_OUTOF10: 8
PAINLEVEL_OUTOF10: 10
PAINLEVEL_OUTOF10: 8

## 2018-10-18 ASSESSMENT — PAIN DESCRIPTION - LOCATION: LOCATION: ABDOMEN

## 2018-10-18 NOTE — PROGRESS NOTES
Hospitalist Progress Note      PCP: Gemma Washington, SHELBY - CNP    Date of Admission: 10/12/2018    Chief Complaint: Shortness of breath with hemoptysis    Hospital Course: Reviewed H&P    Subjective: Still NPO, PEG yesterday, TF initiated today. - patient still with episodes of hemoptysis and melena. EGD negative for GI problem. Strongly suspect supraglottic mass as culprit. Continue to hold anti-platelet therapy  - Hgb remains stable, will continue to monitor. Medications:  Reviewed    Infusion Medications    sodium chloride      sodium chloride 50 mL/hr at 10/17/18 1648    dextrose 50 mL/hr at 10/16/18 0559    dextrose       Scheduled Medications    sodium chloride flush  10 mL Intravenous 2 times per day    levothyroxine  87.5 mcg Intravenous Daily    insulin lispro  0-6 Units Subcutaneous 6 times per day    nicotine  1 patch Transdermal Daily    piperacillin-tazobactam  3.375 g Intravenous Q8H    atorvastatin  10 mg Oral Daily    PARoxetine  20 mg Oral Daily    lisinopril  40 mg Oral Daily    carvedilol  12.5 mg Oral BID WC    pantoprazole  40 mg Intravenous BID     PRN Meds: sodium chloride flush, morphine, ipratropium-albuterol, albuterol sulfate HFA, benzonatate, glucose, dextrose, glucagon (rDNA), dextrose, oxyCODONE      Intake/Output Summary (Last 24 hours) at 10/18/18 1620  Last data filed at 10/18/18 1508   Gross per 24 hour   Intake             1188 ml   Output                0 ml   Net             1188 ml       Physical Exam Performed:  BP (!) 145/77   Pulse 80   Temp 98.4 °F (36.9 °C) (Oral)   Resp 16   Ht 5' 2\" (1.575 m)   Wt 142 lb (64.4 kg)   LMP  (LMP Unknown)   SpO2 93%   BMI 25.97 kg/m²      General appearance: Elderly female in No apparent distress, appears stated age and cooperative. Oxygen by nasal cannula 4 L/m  HEENT:  Normal cephalic, atraumatic without obvious deformity. Pupils equal, round, and reactive to light. Extra ocular muscles intact.

## 2018-10-19 LAB
ANION GAP SERPL CALCULATED.3IONS-SCNC: 11 MMOL/L (ref 3–16)
BASOPHILS ABSOLUTE: 0.1 K/UL (ref 0–0.2)
BASOPHILS RELATIVE PERCENT: 0.7 %
BUN BLDV-MCNC: 14 MG/DL (ref 7–20)
CALCIUM SERPL-MCNC: 7.9 MG/DL (ref 8.3–10.6)
CHLORIDE BLD-SCNC: 105 MMOL/L (ref 99–110)
CO2: 24 MMOL/L (ref 21–32)
CREAT SERPL-MCNC: 1.8 MG/DL (ref 0.6–1.2)
EOSINOPHILS ABSOLUTE: 0 K/UL (ref 0–0.6)
EOSINOPHILS RELATIVE PERCENT: 0.1 %
GFR AFRICAN AMERICAN: 34
GFR NON-AFRICAN AMERICAN: 28
GLUCOSE BLD-MCNC: 177 MG/DL (ref 70–99)
GLUCOSE BLD-MCNC: 200 MG/DL (ref 70–99)
GLUCOSE BLD-MCNC: 201 MG/DL (ref 70–99)
GLUCOSE BLD-MCNC: 215 MG/DL (ref 70–99)
GLUCOSE BLD-MCNC: 218 MG/DL (ref 70–99)
GLUCOSE BLD-MCNC: 219 MG/DL (ref 70–99)
GLUCOSE BLD-MCNC: 231 MG/DL (ref 70–99)
HCT VFR BLD CALC: 25.7 % (ref 36–48)
HCT VFR BLD CALC: 26.1 % (ref 36–48)
HCT VFR BLD CALC: 28 % (ref 36–48)
HEMOGLOBIN: 8.2 G/DL (ref 12–16)
HEMOGLOBIN: 8.5 G/DL (ref 12–16)
HEMOGLOBIN: 8.9 G/DL (ref 12–16)
LYMPHOCYTES ABSOLUTE: 1.1 K/UL (ref 1–5.1)
LYMPHOCYTES RELATIVE PERCENT: 13.5 %
MAGNESIUM: 2.1 MG/DL (ref 1.8–2.4)
MCH RBC QN AUTO: 24.6 PG (ref 26–34)
MCHC RBC AUTO-ENTMCNC: 31.9 G/DL (ref 31–36)
MCV RBC AUTO: 77.2 FL (ref 80–100)
MONOCYTES ABSOLUTE: 0.4 K/UL (ref 0–1.3)
MONOCYTES RELATIVE PERCENT: 4.8 %
NEUTROPHILS ABSOLUTE: 6.4 K/UL (ref 1.7–7.7)
NEUTROPHILS RELATIVE PERCENT: 80.9 %
PDW BLD-RTO: 17.2 % (ref 12.4–15.4)
PERFORMED ON: ABNORMAL
PHOSPHORUS: 2.4 MG/DL (ref 2.5–4.9)
PLATELET # BLD: 320 K/UL (ref 135–450)
PMV BLD AUTO: 6.6 FL (ref 5–10.5)
POTASSIUM REFLEX MAGNESIUM: 3.5 MMOL/L (ref 3.5–5.1)
RBC # BLD: 3.33 M/UL (ref 4–5.2)
SODIUM BLD-SCNC: 140 MMOL/L (ref 136–145)
WBC # BLD: 8 K/UL (ref 4–11)

## 2018-10-19 PROCEDURE — 6360000002 HC RX W HCPCS: Performed by: INTERNAL MEDICINE

## 2018-10-19 PROCEDURE — 94761 N-INVAS EAR/PLS OXIMETRY MLT: CPT

## 2018-10-19 PROCEDURE — 2500000003 HC RX 250 WO HCPCS: Performed by: NURSE PRACTITIONER

## 2018-10-19 PROCEDURE — 83735 ASSAY OF MAGNESIUM: CPT

## 2018-10-19 PROCEDURE — 6360000002 HC RX W HCPCS: Performed by: NURSE PRACTITIONER

## 2018-10-19 PROCEDURE — 2580000003 HC RX 258: Performed by: INTERNAL MEDICINE

## 2018-10-19 PROCEDURE — 85018 HEMOGLOBIN: CPT

## 2018-10-19 PROCEDURE — 2700000000 HC OXYGEN THERAPY PER DAY

## 2018-10-19 PROCEDURE — C9113 INJ PANTOPRAZOLE SODIUM, VIA: HCPCS | Performed by: INTERNAL MEDICINE

## 2018-10-19 PROCEDURE — 84100 ASSAY OF PHOSPHORUS: CPT

## 2018-10-19 PROCEDURE — 1200000000 HC SEMI PRIVATE

## 2018-10-19 PROCEDURE — 97116 GAIT TRAINING THERAPY: CPT

## 2018-10-19 PROCEDURE — 36415 COLL VENOUS BLD VENIPUNCTURE: CPT

## 2018-10-19 PROCEDURE — 80048 BASIC METABOLIC PNL TOTAL CA: CPT

## 2018-10-19 PROCEDURE — 6370000000 HC RX 637 (ALT 250 FOR IP): Performed by: INTERNAL MEDICINE

## 2018-10-19 PROCEDURE — 85025 COMPLETE CBC W/AUTO DIFF WBC: CPT

## 2018-10-19 PROCEDURE — 85014 HEMATOCRIT: CPT

## 2018-10-19 RX ADMIN — INSULIN LISPRO 2 UNITS: 100 INJECTION, SOLUTION INTRAVENOUS; SUBCUTANEOUS at 09:19

## 2018-10-19 RX ADMIN — PANTOPRAZOLE SODIUM 40 MG: 40 INJECTION, POWDER, FOR SOLUTION INTRAVENOUS at 22:10

## 2018-10-19 RX ADMIN — CARVEDILOL 12.5 MG: 6.25 TABLET, FILM COATED ORAL at 16:33

## 2018-10-19 RX ADMIN — INSULIN LISPRO 2 UNITS: 100 INJECTION, SOLUTION INTRAVENOUS; SUBCUTANEOUS at 16:33

## 2018-10-19 RX ADMIN — LISINOPRIL 40 MG: 20 TABLET ORAL at 09:17

## 2018-10-19 RX ADMIN — LEVOTHYROXINE SODIUM ANHYDROUS 87.5 MCG: 100 INJECTION, POWDER, LYOPHILIZED, FOR SOLUTION INTRAVENOUS at 05:57

## 2018-10-19 RX ADMIN — CARVEDILOL 12.5 MG: 6.25 TABLET, FILM COATED ORAL at 09:17

## 2018-10-19 RX ADMIN — OXYCODONE HYDROCHLORIDE 5 MG: 5 TABLET ORAL at 02:32

## 2018-10-19 RX ADMIN — INSULIN LISPRO 1 UNITS: 100 INJECTION, SOLUTION INTRAVENOUS; SUBCUTANEOUS at 00:21

## 2018-10-19 RX ADMIN — PIPERACILLIN SODIUM,TAZOBACTAM SODIUM 3.38 G: 3; .375 INJECTION, POWDER, FOR SOLUTION INTRAVENOUS at 14:03

## 2018-10-19 RX ADMIN — SODIUM CHLORIDE: 9 INJECTION, SOLUTION INTRAVENOUS at 19:12

## 2018-10-19 RX ADMIN — INSULIN LISPRO 2 UNITS: 100 INJECTION, SOLUTION INTRAVENOUS; SUBCUTANEOUS at 21:26

## 2018-10-19 RX ADMIN — INSULIN LISPRO 2 UNITS: 100 INJECTION, SOLUTION INTRAVENOUS; SUBCUTANEOUS at 04:33

## 2018-10-19 RX ADMIN — PAROXETINE HYDROCHLORIDE 20 MG: 20 TABLET, FILM COATED ORAL at 09:18

## 2018-10-19 RX ADMIN — OXYCODONE HYDROCHLORIDE 5 MG: 5 TABLET ORAL at 14:44

## 2018-10-19 RX ADMIN — PIPERACILLIN SODIUM,TAZOBACTAM SODIUM 3.38 G: 3; .375 INJECTION, POWDER, FOR SOLUTION INTRAVENOUS at 05:27

## 2018-10-19 RX ADMIN — PANTOPRAZOLE SODIUM 40 MG: 40 INJECTION, POWDER, FOR SOLUTION INTRAVENOUS at 09:17

## 2018-10-19 RX ADMIN — ATORVASTATIN CALCIUM 10 MG: 10 TABLET, FILM COATED ORAL at 09:17

## 2018-10-19 RX ADMIN — INSULIN LISPRO 2 UNITS: 100 INJECTION, SOLUTION INTRAVENOUS; SUBCUTANEOUS at 12:38

## 2018-10-19 RX ADMIN — HYDRALAZINE HYDROCHLORIDE 10 MG: 20 INJECTION INTRAMUSCULAR; INTRAVENOUS at 19:46

## 2018-10-19 RX ADMIN — SODIUM CHLORIDE, PRESERVATIVE FREE 10 ML: 5 INJECTION INTRAVENOUS at 22:11

## 2018-10-19 ASSESSMENT — PAIN SCALES - GENERAL
PAINLEVEL_OUTOF10: 10
PAINLEVEL_OUTOF10: 9

## 2018-10-19 NOTE — PROGRESS NOTES
of motion. No jugular venous distention. Trachea midline. Respiratory:  Normal respiratory effort. Diminished breath sounds at bases with fine crackles  Cardiovascular:  Regular rate and rhythm with normal S1/S2 without murmurs, rubs or gallops. Abdomen: Soft, non-tender, non-distended with normal bowel sounds. Musculoskeletal:  No clubbing, cyanosis or edema bilaterally. Full range of motion without deformity. Skin: Skin color, texture, turgor normal.  No rashes or lesions. Neurologic:  Neurovascularly intact without any focal sensory/motor deficits. Cranial nerves: II-XII intact, grossly non-focal.  Psychiatric:  Alert and oriented, thought content appropriate, normal insight  Capillary Refill: Brisk,< 3 seconds   Peripheral Pulses: +2 palpable, equal bilaterally        Labs:   Recent Labs      10/17/18   0545   10/18/18   0522  10/18/18   1643  10/19/18   0001  10/19/18   0540   WBC  8.2   --   7.5   --    --   8.0   HGB  8.3*   < >  8.7*  9.5*  8.5*  8.2*   HCT  25.4*   < >  26.9*  29.7*  26.1*  25.7*   PLT  263   --   292   --    --   320    < > = values in this interval not displayed. Recent Labs      10/17/18   0545  10/18/18   0522  10/19/18   0540   NA  133*  143  140   K  3.6  3.2*  3.2*  3.5   CL  98*  104  105   CO2  28  25  24   BUN  10  9  14   CREATININE  1.5*  1.6*  1.8*   CALCIUM  8.2*  8.4  7.9*   PHOS   --   3.2  2.4*     Recent Labs      10/18/18   0522   AST  8*   ALT  <5*   BILITOT  0.3   ALKPHOS  58     No results for input(s): INR in the last 72 hours. No results for input(s): [place] in the last 72 hours.   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

## 2018-10-19 NOTE — PROGRESS NOTES
Devices  Type of devices:  All fall risk precautions in place, Call light within reach, Chair alarm in place, Gait belt, Patient at risk for falls, Nurse notified, Left in chair     Therapy Time   Individual Concurrent Group Co-treatment   Time In 1357         Time Out 1415         Minutes 18         Timed Code Treatment Minutes: [facility]

## 2018-10-19 NOTE — PROGRESS NOTES
is soft. Bowel sounds are normal.   There is no abdominal tenderness. Assessment & Plan  31HV F with COPD, CHF, DM, CVA and a suspected neoplastic supraglottic lesion who is admitted with dyspnea and noted to have worsening anemia and melena. She could possibly have bled from her lesion or from a PUD. It appears that she presents a very high risk of aspiration as determined by SLP bedside eval. EGD neg.  PEG looks good and is tolerating feeding.  - Change PEG dressing  - OK to use PEG  - OK to proceed w Rx of her throat tumor  - Will sign off     Aime Farmer MD       ([phone]) [phone]  Aime Farmer MD  10/19/2018

## 2018-10-19 NOTE — PROGRESS NOTES
Pt's BP elevated, 183/64. PRN hydralazine administered. Pt denies pain at this time. Resting in bed. Tube feed running at goal rate of 60mL/hr.

## 2018-10-20 LAB
ANION GAP SERPL CALCULATED.3IONS-SCNC: 10 MMOL/L (ref 3–16)
BASOPHILS ABSOLUTE: 0.1 K/UL (ref 0–0.2)
BASOPHILS RELATIVE PERCENT: 1.1 %
BUN BLDV-MCNC: 21 MG/DL (ref 7–20)
CALCIUM SERPL-MCNC: 8 MG/DL (ref 8.3–10.6)
CHLORIDE BLD-SCNC: 107 MMOL/L (ref 99–110)
CO2: 25 MMOL/L (ref 21–32)
CREAT SERPL-MCNC: 1.8 MG/DL (ref 0.6–1.2)
EOSINOPHILS ABSOLUTE: 0 K/UL (ref 0–0.6)
EOSINOPHILS RELATIVE PERCENT: 0.1 %
GFR AFRICAN AMERICAN: 34
GFR NON-AFRICAN AMERICAN: 28
GLUCOSE BLD-MCNC: 189 MG/DL (ref 70–99)
GLUCOSE BLD-MCNC: 199 MG/DL (ref 70–99)
GLUCOSE BLD-MCNC: 201 MG/DL (ref 70–99)
GLUCOSE BLD-MCNC: 202 MG/DL (ref 70–99)
GLUCOSE BLD-MCNC: 210 MG/DL (ref 70–99)
GLUCOSE BLD-MCNC: 232 MG/DL (ref 70–99)
GLUCOSE BLD-MCNC: 244 MG/DL (ref 70–99)
GLUCOSE BLD-MCNC: 260 MG/DL (ref 70–99)
HCT VFR BLD CALC: 25.6 % (ref 36–48)
HCT VFR BLD CALC: 27.5 % (ref 36–48)
HEMOGLOBIN: 8.5 G/DL (ref 12–16)
HEMOGLOBIN: 9 G/DL (ref 12–16)
LYMPHOCYTES ABSOLUTE: 1.3 K/UL (ref 1–5.1)
LYMPHOCYTES RELATIVE PERCENT: 10.9 %
MAGNESIUM: 2.4 MG/DL (ref 1.8–2.4)
MCH RBC QN AUTO: 25.4 PG (ref 26–34)
MCHC RBC AUTO-ENTMCNC: 32.6 G/DL (ref 31–36)
MCV RBC AUTO: 78.1 FL (ref 80–100)
MONOCYTES ABSOLUTE: 0.6 K/UL (ref 0–1.3)
MONOCYTES RELATIVE PERCENT: 4.8 %
NEUTROPHILS ABSOLUTE: 9.9 K/UL (ref 1.7–7.7)
NEUTROPHILS RELATIVE PERCENT: 83.1 %
PDW BLD-RTO: 17.7 % (ref 12.4–15.4)
PERFORMED ON: ABNORMAL
PHOSPHORUS: 1.8 MG/DL (ref 2.5–4.9)
PLATELET # BLD: 385 K/UL (ref 135–450)
PMV BLD AUTO: 7.5 FL (ref 5–10.5)
POTASSIUM REFLEX MAGNESIUM: 3.9 MMOL/L (ref 3.5–5.1)
RBC # BLD: 3.52 M/UL (ref 4–5.2)
SODIUM BLD-SCNC: 142 MMOL/L (ref 136–145)
WBC # BLD: 11.9 K/UL (ref 4–11)

## 2018-10-20 PROCEDURE — 2580000003 HC RX 258: Performed by: INTERNAL MEDICINE

## 2018-10-20 PROCEDURE — 2500000003 HC RX 250 WO HCPCS: Performed by: NURSE PRACTITIONER

## 2018-10-20 PROCEDURE — 6360000002 HC RX W HCPCS: Performed by: INTERNAL MEDICINE

## 2018-10-20 PROCEDURE — 6370000000 HC RX 637 (ALT 250 FOR IP): Performed by: INTERNAL MEDICINE

## 2018-10-20 PROCEDURE — 85018 HEMOGLOBIN: CPT

## 2018-10-20 PROCEDURE — 85014 HEMATOCRIT: CPT

## 2018-10-20 PROCEDURE — 6360000002 HC RX W HCPCS: Performed by: NURSE PRACTITIONER

## 2018-10-20 PROCEDURE — C9113 INJ PANTOPRAZOLE SODIUM, VIA: HCPCS | Performed by: INTERNAL MEDICINE

## 2018-10-20 PROCEDURE — 94761 N-INVAS EAR/PLS OXIMETRY MLT: CPT

## 2018-10-20 PROCEDURE — 1200000000 HC SEMI PRIVATE

## 2018-10-20 PROCEDURE — 85025 COMPLETE CBC W/AUTO DIFF WBC: CPT

## 2018-10-20 PROCEDURE — 83735 ASSAY OF MAGNESIUM: CPT

## 2018-10-20 PROCEDURE — 80048 BASIC METABOLIC PNL TOTAL CA: CPT

## 2018-10-20 PROCEDURE — 84100 ASSAY OF PHOSPHORUS: CPT

## 2018-10-20 PROCEDURE — 36415 COLL VENOUS BLD VENIPUNCTURE: CPT

## 2018-10-20 PROCEDURE — 2700000000 HC OXYGEN THERAPY PER DAY

## 2018-10-20 RX ADMIN — SODIUM CHLORIDE: 9 INJECTION, SOLUTION INTRAVENOUS at 15:10

## 2018-10-20 RX ADMIN — SODIUM CHLORIDE, PRESERVATIVE FREE 10 ML: 5 INJECTION INTRAVENOUS at 19:54

## 2018-10-20 RX ADMIN — INSULIN LISPRO 3 UNITS: 100 INJECTION, SOLUTION INTRAVENOUS; SUBCUTANEOUS at 21:08

## 2018-10-20 RX ADMIN — HYDRALAZINE HYDROCHLORIDE 10 MG: 20 INJECTION INTRAMUSCULAR; INTRAVENOUS at 19:55

## 2018-10-20 RX ADMIN — CARVEDILOL 12.5 MG: 6.25 TABLET, FILM COATED ORAL at 17:22

## 2018-10-20 RX ADMIN — OXYCODONE HYDROCHLORIDE 5 MG: 5 TABLET ORAL at 02:54

## 2018-10-20 RX ADMIN — INSULIN LISPRO 2 UNITS: 100 INJECTION, SOLUTION INTRAVENOUS; SUBCUTANEOUS at 00:11

## 2018-10-20 RX ADMIN — LISINOPRIL 40 MG: 20 TABLET ORAL at 09:01

## 2018-10-20 RX ADMIN — INSULIN LISPRO 2 UNITS: 100 INJECTION, SOLUTION INTRAVENOUS; SUBCUTANEOUS at 12:39

## 2018-10-20 RX ADMIN — INSULIN LISPRO 2 UNITS: 100 INJECTION, SOLUTION INTRAVENOUS; SUBCUTANEOUS at 03:48

## 2018-10-20 RX ADMIN — ATORVASTATIN CALCIUM 10 MG: 10 TABLET, FILM COATED ORAL at 09:01

## 2018-10-20 RX ADMIN — SODIUM CHLORIDE, PRESERVATIVE FREE 10 ML: 5 INJECTION INTRAVENOUS at 09:01

## 2018-10-20 RX ADMIN — INSULIN LISPRO 2 UNITS: 100 INJECTION, SOLUTION INTRAVENOUS; SUBCUTANEOUS at 09:01

## 2018-10-20 RX ADMIN — LEVOTHYROXINE SODIUM ANHYDROUS 87.5 MCG: 100 INJECTION, POWDER, LYOPHILIZED, FOR SOLUTION INTRAVENOUS at 06:33

## 2018-10-20 RX ADMIN — PANTOPRAZOLE SODIUM 40 MG: 40 INJECTION, POWDER, FOR SOLUTION INTRAVENOUS at 09:00

## 2018-10-20 RX ADMIN — CARVEDILOL 12.5 MG: 6.25 TABLET, FILM COATED ORAL at 09:01

## 2018-10-20 RX ADMIN — INSULIN LISPRO 2 UNITS: 100 INJECTION, SOLUTION INTRAVENOUS; SUBCUTANEOUS at 17:22

## 2018-10-20 RX ADMIN — OXYCODONE HYDROCHLORIDE 5 MG: 5 TABLET ORAL at 17:22

## 2018-10-20 RX ADMIN — PAROXETINE HYDROCHLORIDE 20 MG: 20 TABLET, FILM COATED ORAL at 09:01

## 2018-10-20 RX ADMIN — MORPHINE SULFATE 2 MG: 2 INJECTION, SOLUTION INTRAMUSCULAR; INTRAVENOUS at 23:46

## 2018-10-20 RX ADMIN — OXYCODONE HYDROCHLORIDE 5 MG: 5 TABLET ORAL at 12:39

## 2018-10-20 RX ADMIN — PANTOPRAZOLE SODIUM 40 MG: 40 INJECTION, POWDER, FOR SOLUTION INTRAVENOUS at 19:55

## 2018-10-20 ASSESSMENT — PAIN SCALES - GENERAL
PAINLEVEL_OUTOF10: 9
PAINLEVEL_OUTOF10: 10
PAINLEVEL_OUTOF10: 9

## 2018-10-20 ASSESSMENT — PAIN DESCRIPTION - PAIN TYPE: TYPE: ACUTE PAIN

## 2018-10-21 LAB
ANION GAP SERPL CALCULATED.3IONS-SCNC: 8 MMOL/L (ref 3–16)
BASOPHILS ABSOLUTE: 0.1 K/UL (ref 0–0.2)
BASOPHILS RELATIVE PERCENT: 0.6 %
BUN BLDV-MCNC: 23 MG/DL (ref 7–20)
CALCIUM SERPL-MCNC: 8.1 MG/DL (ref 8.3–10.6)
CHLORIDE BLD-SCNC: 112 MMOL/L (ref 99–110)
CO2: 26 MMOL/L (ref 21–32)
CREAT SERPL-MCNC: 1.7 MG/DL (ref 0.6–1.2)
EOSINOPHILS ABSOLUTE: 0 K/UL (ref 0–0.6)
EOSINOPHILS RELATIVE PERCENT: 0.1 %
GFR AFRICAN AMERICAN: 36
GFR NON-AFRICAN AMERICAN: 30
GLUCOSE BLD-MCNC: 220 MG/DL (ref 70–99)
GLUCOSE BLD-MCNC: 233 MG/DL (ref 70–99)
GLUCOSE BLD-MCNC: 246 MG/DL (ref 70–99)
GLUCOSE BLD-MCNC: 255 MG/DL (ref 70–99)
GLUCOSE BLD-MCNC: 256 MG/DL (ref 70–99)
GLUCOSE BLD-MCNC: 287 MG/DL (ref 70–99)
HCT VFR BLD CALC: 27.4 % (ref 36–48)
HEMOGLOBIN: 8.5 G/DL (ref 12–16)
LYMPHOCYTES ABSOLUTE: 1.4 K/UL (ref 1–5.1)
LYMPHOCYTES RELATIVE PERCENT: 13 %
MCH RBC QN AUTO: 25 PG (ref 26–34)
MCHC RBC AUTO-ENTMCNC: 31.2 G/DL (ref 31–36)
MCV RBC AUTO: 80.2 FL (ref 80–100)
MONOCYTES ABSOLUTE: 0.6 K/UL (ref 0–1.3)
MONOCYTES RELATIVE PERCENT: 5.7 %
NEUTROPHILS ABSOLUTE: 8.9 K/UL (ref 1.7–7.7)
NEUTROPHILS RELATIVE PERCENT: 80.6 %
PDW BLD-RTO: 17.9 % (ref 12.4–15.4)
PERFORMED ON: ABNORMAL
PLATELET # BLD: 377 K/UL (ref 135–450)
PMV BLD AUTO: 7.3 FL (ref 5–10.5)
POTASSIUM REFLEX MAGNESIUM: 3.9 MMOL/L (ref 3.5–5.1)
RBC # BLD: 3.41 M/UL (ref 4–5.2)
SODIUM BLD-SCNC: 146 MMOL/L (ref 136–145)
WBC # BLD: 11 K/UL (ref 4–11)

## 2018-10-21 PROCEDURE — 6360000002 HC RX W HCPCS: Performed by: INTERNAL MEDICINE

## 2018-10-21 PROCEDURE — 2580000003 HC RX 258: Performed by: INTERNAL MEDICINE

## 2018-10-21 PROCEDURE — 36415 COLL VENOUS BLD VENIPUNCTURE: CPT

## 2018-10-21 PROCEDURE — 2500000003 HC RX 250 WO HCPCS: Performed by: NURSE PRACTITIONER

## 2018-10-21 PROCEDURE — 1200000000 HC SEMI PRIVATE

## 2018-10-21 PROCEDURE — C9113 INJ PANTOPRAZOLE SODIUM, VIA: HCPCS | Performed by: INTERNAL MEDICINE

## 2018-10-21 PROCEDURE — 80048 BASIC METABOLIC PNL TOTAL CA: CPT

## 2018-10-21 PROCEDURE — 6370000000 HC RX 637 (ALT 250 FOR IP): Performed by: INTERNAL MEDICINE

## 2018-10-21 PROCEDURE — 85025 COMPLETE CBC W/AUTO DIFF WBC: CPT

## 2018-10-21 RX ADMIN — INSULIN LISPRO 3 UNITS: 100 INJECTION, SOLUTION INTRAVENOUS; SUBCUTANEOUS at 04:20

## 2018-10-21 RX ADMIN — LEVOTHYROXINE SODIUM ANHYDROUS 87.5 MCG: 100 INJECTION, POWDER, LYOPHILIZED, FOR SOLUTION INTRAVENOUS at 07:48

## 2018-10-21 RX ADMIN — MORPHINE SULFATE 2 MG: 2 INJECTION, SOLUTION INTRAMUSCULAR; INTRAVENOUS at 16:54

## 2018-10-21 RX ADMIN — LISINOPRIL 40 MG: 20 TABLET ORAL at 10:41

## 2018-10-21 RX ADMIN — SODIUM CHLORIDE, PRESERVATIVE FREE 10 ML: 5 INJECTION INTRAVENOUS at 22:48

## 2018-10-21 RX ADMIN — PANTOPRAZOLE SODIUM 40 MG: 40 INJECTION, POWDER, FOR SOLUTION INTRAVENOUS at 10:40

## 2018-10-21 RX ADMIN — INSULIN LISPRO 1 UNITS: 100 INJECTION, SOLUTION INTRAVENOUS; SUBCUTANEOUS at 01:34

## 2018-10-21 RX ADMIN — CARVEDILOL 12.5 MG: 6.25 TABLET, FILM COATED ORAL at 16:37

## 2018-10-21 RX ADMIN — MORPHINE SULFATE 2 MG: 2 INJECTION, SOLUTION INTRAMUSCULAR; INTRAVENOUS at 22:57

## 2018-10-21 RX ADMIN — CARVEDILOL 12.5 MG: 6.25 TABLET, FILM COATED ORAL at 10:40

## 2018-10-21 RX ADMIN — INSULIN LISPRO 2 UNITS: 100 INJECTION, SOLUTION INTRAVENOUS; SUBCUTANEOUS at 10:46

## 2018-10-21 RX ADMIN — ATORVASTATIN CALCIUM 10 MG: 10 TABLET, FILM COATED ORAL at 10:40

## 2018-10-21 RX ADMIN — MORPHINE SULFATE 2 MG: 2 INJECTION, SOLUTION INTRAMUSCULAR; INTRAVENOUS at 04:20

## 2018-10-21 RX ADMIN — SODIUM CHLORIDE, PRESERVATIVE FREE 10 ML: 5 INJECTION INTRAVENOUS at 10:40

## 2018-10-21 RX ADMIN — PAROXETINE HYDROCHLORIDE 20 MG: 20 TABLET, FILM COATED ORAL at 10:40

## 2018-10-21 RX ADMIN — INSULIN LISPRO 2 UNITS: 100 INJECTION, SOLUTION INTRAVENOUS; SUBCUTANEOUS at 16:37

## 2018-10-21 RX ADMIN — INSULIN LISPRO 3 UNITS: 100 INJECTION, SOLUTION INTRAVENOUS; SUBCUTANEOUS at 22:49

## 2018-10-21 RX ADMIN — INSULIN LISPRO 3 UNITS: 100 INJECTION, SOLUTION INTRAVENOUS; SUBCUTANEOUS at 14:17

## 2018-10-21 RX ADMIN — PANTOPRAZOLE SODIUM 40 MG: 40 INJECTION, POWDER, FOR SOLUTION INTRAVENOUS at 22:53

## 2018-10-21 ASSESSMENT — PAIN DESCRIPTION - PAIN TYPE: TYPE: ACUTE PAIN

## 2018-10-21 ASSESSMENT — PAIN SCALES - GENERAL
PAINLEVEL_OUTOF10: 9

## 2018-10-21 ASSESSMENT — PAIN DESCRIPTION - LOCATION: LOCATION: ABDOMEN

## 2018-10-21 ASSESSMENT — PAIN DESCRIPTION - ORIENTATION: ORIENTATION: LEFT

## 2018-10-21 NOTE — PROGRESS NOTES
Assessment as charted. A&O x 4.  BP elevated 198/78, PRN hydralazine administered per order. Tube feed at goal rate 60 mL/hr, pt tolerating well. Bowel sounds active x 4. Up to chair with assistance. Alarm on chair functioning appropriately. Non-skid footwear on. Call light and table within reach. Will monitor.   Electronically signed by Ja Wallace RN on 10/20/2018 at 10:32 PM

## 2018-10-21 NOTE — PROGRESS NOTES
and trended Troponin - Negative     11. Acute blood loss anemia in the setting of hemoptysis and melanotic stools - probably bleeding from recent biopsy of supraglottic mass at  a week ago prior to this admission   - Holding aspirin and Plavix and monitoring hemoglobin every 8 hours with plans to transfuse for hemoglobin less than 7  - GI consulted, EGD unremarkable except supraglottic mass, PEG tube placement on 10/17   - Pulmonology evaluated and recommended transfer to [facility] for possible ENT evaluation. ENT called on 10/18 and stated due to recent stability of Hgb, she was ok for discharge. Her outpatient ENT appointment was moved up to earlier next week. She will need to stay off ASA and plavix until evaluated by ENT as outpatient.     12.  Severe dysphagia with high risk of aspiration - as per bedside swallow evaluation by SLP on 10/13/2018  - Patient was recommended strict nothing by mouth and to consider alternative means of nutrition .   - Discussed with patient and her son Nancy Summers [facility]) regarding PEG tube placement - who are in agreement to proceed  - GI placed PEG 10/17, tube feeds at goal  - added free water boluses today due to mild hypernatremia     DVT Prophylaxis: Lovenox   Diet: DIET TUBE FEED CONTINUOUS/CYCLIC NPO; Diabetic 1.5; Gastrostomy; Continuous; 20; 60; 24  Code Status: Full Code    PT/OT Eval Status: Home with 24 hour supervision    Dispo - Attempting SNF due to difficult home situation, medically ready for discharge    Dejah Toure MD

## 2018-10-22 VITALS
TEMPERATURE: 98 F | RESPIRATION RATE: 16 BRPM | DIASTOLIC BLOOD PRESSURE: 84 MMHG | OXYGEN SATURATION: 97 % | HEART RATE: 71 BPM | BODY MASS INDEX: 26.13 KG/M2 | SYSTOLIC BLOOD PRESSURE: 179 MMHG | WEIGHT: 142 LBS | HEIGHT: 62 IN

## 2018-10-22 LAB
ANION GAP SERPL CALCULATED.3IONS-SCNC: 7 MMOL/L (ref 3–16)
BASOPHILS ABSOLUTE: 0.1 K/UL (ref 0–0.2)
BASOPHILS RELATIVE PERCENT: 0.9 %
BUN BLDV-MCNC: 25 MG/DL (ref 7–20)
CALCIUM SERPL-MCNC: 8.2 MG/DL (ref 8.3–10.6)
CHLORIDE BLD-SCNC: 109 MMOL/L (ref 99–110)
CO2: 27 MMOL/L (ref 21–32)
CREAT SERPL-MCNC: 1.5 MG/DL (ref 0.6–1.2)
EOSINOPHILS ABSOLUTE: 0 K/UL (ref 0–0.6)
EOSINOPHILS RELATIVE PERCENT: 0.1 %
GFR AFRICAN AMERICAN: 42
GFR NON-AFRICAN AMERICAN: 35
GLUCOSE BLD-MCNC: 224 MG/DL (ref 70–99)
GLUCOSE BLD-MCNC: 240 MG/DL (ref 70–99)
GLUCOSE BLD-MCNC: 249 MG/DL (ref 70–99)
GLUCOSE BLD-MCNC: 270 MG/DL (ref 70–99)
GLUCOSE BLD-MCNC: 274 MG/DL (ref 70–99)
GLUCOSE BLD-MCNC: 278 MG/DL (ref 70–99)
HCT VFR BLD CALC: 28.7 % (ref 36–48)
HEMOGLOBIN: 8.9 G/DL (ref 12–16)
LYMPHOCYTES ABSOLUTE: 1.5 K/UL (ref 1–5.1)
LYMPHOCYTES RELATIVE PERCENT: 15.3 %
MAGNESIUM: 2.7 MG/DL (ref 1.8–2.4)
MCH RBC QN AUTO: 25.2 PG (ref 26–34)
MCHC RBC AUTO-ENTMCNC: 31.1 G/DL (ref 31–36)
MCV RBC AUTO: 80.9 FL (ref 80–100)
MONOCYTES ABSOLUTE: 0.6 K/UL (ref 0–1.3)
MONOCYTES RELATIVE PERCENT: 5.8 %
NEUTROPHILS ABSOLUTE: 7.7 K/UL (ref 1.7–7.7)
NEUTROPHILS RELATIVE PERCENT: 77.9 %
PDW BLD-RTO: 17.6 % (ref 12.4–15.4)
PERFORMED ON: ABNORMAL
PHOSPHORUS: 2.9 MG/DL (ref 2.5–4.9)
PLATELET # BLD: 338 K/UL (ref 135–450)
PMV BLD AUTO: 7.3 FL (ref 5–10.5)
POTASSIUM REFLEX MAGNESIUM: 4.3 MMOL/L (ref 3.5–5.1)
RBC # BLD: 3.54 M/UL (ref 4–5.2)
SODIUM BLD-SCNC: 143 MMOL/L (ref 136–145)
WBC # BLD: 9.8 K/UL (ref 4–11)

## 2018-10-22 PROCEDURE — 6360000002 HC RX W HCPCS: Performed by: INTERNAL MEDICINE

## 2018-10-22 PROCEDURE — 97530 THERAPEUTIC ACTIVITIES: CPT

## 2018-10-22 PROCEDURE — 85025 COMPLETE CBC W/AUTO DIFF WBC: CPT

## 2018-10-22 PROCEDURE — 2580000003 HC RX 258: Performed by: INTERNAL MEDICINE

## 2018-10-22 PROCEDURE — 6370000000 HC RX 637 (ALT 250 FOR IP): Performed by: INTERNAL MEDICINE

## 2018-10-22 PROCEDURE — 97116 GAIT TRAINING THERAPY: CPT

## 2018-10-22 PROCEDURE — C9113 INJ PANTOPRAZOLE SODIUM, VIA: HCPCS | Performed by: INTERNAL MEDICINE

## 2018-10-22 PROCEDURE — 92526 ORAL FUNCTION THERAPY: CPT

## 2018-10-22 PROCEDURE — 36415 COLL VENOUS BLD VENIPUNCTURE: CPT

## 2018-10-22 PROCEDURE — 80048 BASIC METABOLIC PNL TOTAL CA: CPT

## 2018-10-22 PROCEDURE — 97535 SELF CARE MNGMENT TRAINING: CPT

## 2018-10-22 PROCEDURE — 2500000003 HC RX 250 WO HCPCS: Performed by: NURSE PRACTITIONER

## 2018-10-22 PROCEDURE — 97110 THERAPEUTIC EXERCISES: CPT

## 2018-10-22 PROCEDURE — 84100 ASSAY OF PHOSPHORUS: CPT

## 2018-10-22 PROCEDURE — 83735 ASSAY OF MAGNESIUM: CPT

## 2018-10-22 RX ORDER — OXYCODONE HYDROCHLORIDE 5 MG/1
5 TABLET ORAL 2 TIMES DAILY PRN
Qty: 10 TABLET | Refills: 0 | Status: SHIPPED | OUTPATIENT
Start: 2018-10-22 | End: 2018-10-29

## 2018-10-22 RX ADMIN — INSULIN LISPRO 3 UNITS: 100 INJECTION, SOLUTION INTRAVENOUS; SUBCUTANEOUS at 11:52

## 2018-10-22 RX ADMIN — OXYCODONE HYDROCHLORIDE 5 MG: 5 TABLET ORAL at 15:56

## 2018-10-22 RX ADMIN — CARVEDILOL 12.5 MG: 6.25 TABLET, FILM COATED ORAL at 08:23

## 2018-10-22 RX ADMIN — PANTOPRAZOLE SODIUM 40 MG: 40 INJECTION, POWDER, FOR SOLUTION INTRAVENOUS at 08:23

## 2018-10-22 RX ADMIN — SODIUM CHLORIDE, PRESERVATIVE FREE 10 ML: 5 INJECTION INTRAVENOUS at 08:24

## 2018-10-22 RX ADMIN — INSULIN LISPRO 3 UNITS: 100 INJECTION, SOLUTION INTRAVENOUS; SUBCUTANEOUS at 01:43

## 2018-10-22 RX ADMIN — PAROXETINE HYDROCHLORIDE 20 MG: 20 TABLET, FILM COATED ORAL at 08:23

## 2018-10-22 RX ADMIN — LISINOPRIL 40 MG: 20 TABLET ORAL at 08:23

## 2018-10-22 RX ADMIN — INSULIN LISPRO 2 UNITS: 100 INJECTION, SOLUTION INTRAVENOUS; SUBCUTANEOUS at 08:34

## 2018-10-22 RX ADMIN — INSULIN LISPRO 2 UNITS: 100 INJECTION, SOLUTION INTRAVENOUS; SUBCUTANEOUS at 03:57

## 2018-10-22 RX ADMIN — LEVOTHYROXINE SODIUM ANHYDROUS 87.5 MCG: 100 INJECTION, POWDER, LYOPHILIZED, FOR SOLUTION INTRAVENOUS at 08:37

## 2018-10-22 RX ADMIN — ATORVASTATIN CALCIUM 10 MG: 10 TABLET, FILM COATED ORAL at 08:23

## 2018-10-22 ASSESSMENT — PAIN SCALES - WONG BAKER: WONGBAKER_NUMERICALRESPONSE: 6

## 2018-10-22 ASSESSMENT — PAIN DESCRIPTION - LOCATION: LOCATION: ABDOMEN

## 2018-10-22 ASSESSMENT — PAIN DESCRIPTION - PAIN TYPE: TYPE: ACUTE PAIN

## 2018-10-22 ASSESSMENT — PAIN SCALES - GENERAL: PAINLEVEL_OUTOF10: 9

## 2018-10-22 ASSESSMENT — PAIN DESCRIPTION - ORIENTATION: ORIENTATION: LEFT

## 2018-10-22 NOTE — PROGRESS NOTES
Speech Language Pathology  Facility/Department: [facility] C3 TELE/MED SURG/ONC  Dysphagia Daily Treatment Note    NAME: Malachi Cameron  : 1950  MRN: [id number]    Patient Diagnosis(es):   Patient Active Problem List    Diagnosis Date Noted    Anemia      Priority: High    Hyponatremia 06/10/2015     Priority: High     Class: Acute    DM2/IDDM 2010     Priority: High    Anxiety 2015     Priority: Medium    Diabetic polyneuropathy ([address] 2014     Priority: Medium    Hypothyroidism 2014     Priority: Medium    Hypertension      Priority: Medium    HLD (hyperlipidemia)      Priority: Medium    Hx CVA 2013     Priority: Medium    Hemoptysis 10/13/2018    Neuroendocrine neoplasm of larynx 10/13/2018    Chronic respiratory failure with hypoxia and hypercapnia (HCC) 10/13/2018    Anemia due to blood loss 10/13/2018    Acute on chronic respiratory failure with hypoxia (HCC) 10/12/2018    Chest pain 2018    SOB (shortness of breath) 2018    Chronic combined systolic and diastolic congestive heart failure (HCC)     Diarrhea     Closed fracture of right ankle     Liver disease     Acute respiratory failure with hypoxia and hypercapnia (HCC) 2018    Thrombocytosis (HCC) 2018    Elevated brain natriuretic peptide (BNP) level 2018    Hypochloremia 2018    CKD (chronic kidney disease) stage 3, GFR 30-59 ml/min (HCC) 2018    SIRS [facility]systemic inflammatory response syndrome) ([facility]) 2018    Syncope and collapse 2018    Head trauma 2018    Nausea, vomiting, and diarrhea 2018    Pleural effusion, left 2018    Tobacco smoker 2018    R medial malleolus fracture 2018    R distal fibula fracture 2018    R great toe fracture 2018    QTc-prolongation 2018    Left carotid stenosis 2015    Chronic back pain 2010     Allergies:    Allergies   Allergen Reactions

## 2018-10-23 ENCOUNTER — APPOINTMENT (OUTPATIENT)
Dept: CT IMAGING | Age: 68
End: 2018-10-23
Payer: MEDICARE

## 2018-10-23 ENCOUNTER — HOSPITAL ENCOUNTER (EMERGENCY)
Age: 68
Discharge: ANOTHER HOSPITAL | End: 2018-10-23
Attending: EMERGENCY MEDICINE
Payer: MEDICARE

## 2018-10-23 VITALS
TEMPERATURE: 98.2 F | BODY MASS INDEX: 26.13 KG/M2 | DIASTOLIC BLOOD PRESSURE: 114 MMHG | SYSTOLIC BLOOD PRESSURE: 230 MMHG | OXYGEN SATURATION: 100 % | WEIGHT: 142 LBS | RESPIRATION RATE: 18 BRPM | HEIGHT: 62 IN | HEART RATE: 94 BPM

## 2018-10-23 DIAGNOSIS — R04.2 HEMOPTYSIS: ICD-10-CM

## 2018-10-23 DIAGNOSIS — J38.7 SUPRAGLOTTIC MASS: Primary | ICD-10-CM

## 2018-10-23 LAB
A/G RATIO: 0.7 (ref 1.1–2.2)
ABO/RH: NORMAL
ALBUMIN SERPL-MCNC: 2.5 G/DL (ref 3.4–5)
ALP BLD-CCNC: 64 U/L (ref 40–129)
ALT SERPL-CCNC: <5 U/L (ref 10–40)
ANION GAP SERPL CALCULATED.3IONS-SCNC: 8 MMOL/L (ref 3–16)
ANTIBODY SCREEN: NORMAL
APTT: 32.3 SEC (ref 26–36)
AST SERPL-CCNC: 12 U/L (ref 15–37)
BASOPHILS ABSOLUTE: 0.1 K/UL (ref 0–0.2)
BASOPHILS RELATIVE PERCENT: 0.7 %
BILIRUB SERPL-MCNC: <0.2 MG/DL (ref 0–1)
BUN BLDV-MCNC: 25 MG/DL (ref 7–20)
CALCIUM SERPL-MCNC: 8.5 MG/DL (ref 8.3–10.6)
CHLORIDE BLD-SCNC: 107 MMOL/L (ref 99–110)
CO2: 29 MMOL/L (ref 21–32)
CREAT SERPL-MCNC: 1.6 MG/DL (ref 0.6–1.2)
EKG ATRIAL RATE: 79 BPM
EKG DIAGNOSIS: NORMAL
EKG P AXIS: 70 DEGREES
EKG P-R INTERVAL: 138 MS
EKG Q-T INTERVAL: 406 MS
EKG QRS DURATION: 88 MS
EKG QTC CALCULATION (BAZETT): 465 MS
EKG R AXIS: 52 DEGREES
EKG T AXIS: 58 DEGREES
EKG VENTRICULAR RATE: 79 BPM
EOSINOPHILS ABSOLUTE: 0 K/UL (ref 0–0.6)
EOSINOPHILS RELATIVE PERCENT: 0 %
GFR AFRICAN AMERICAN: 39
GFR NON-AFRICAN AMERICAN: 32
GLOBULIN: 3.4 G/DL
GLUCOSE BLD-MCNC: 237 MG/DL (ref 70–99)
HCT VFR BLD CALC: 27.6 % (ref 36–48)
HEMOGLOBIN: 9 G/DL (ref 12–16)
INR BLD: 1.04 (ref 0.86–1.14)
LYMPHOCYTES ABSOLUTE: 1.4 K/UL (ref 1–5.1)
LYMPHOCYTES RELATIVE PERCENT: 15.2 %
MCH RBC QN AUTO: 25.5 PG (ref 26–34)
MCHC RBC AUTO-ENTMCNC: 32.5 G/DL (ref 31–36)
MCV RBC AUTO: 78.4 FL (ref 80–100)
MONOCYTES ABSOLUTE: 0.5 K/UL (ref 0–1.3)
MONOCYTES RELATIVE PERCENT: 5.3 %
NEUTROPHILS ABSOLUTE: 7.4 K/UL (ref 1.7–7.7)
NEUTROPHILS RELATIVE PERCENT: 78.8 %
PDW BLD-RTO: 17.3 % (ref 12.4–15.4)
PLATELET # BLD: 358 K/UL (ref 135–450)
PMV BLD AUTO: 7.7 FL (ref 5–10.5)
POTASSIUM SERPL-SCNC: 3.8 MMOL/L (ref 3.5–5.1)
PRO-BNP: ABNORMAL PG/ML (ref 0–124)
PROTHROMBIN TIME: 11.8 SEC (ref 9.8–13)
RBC # BLD: 3.52 M/UL (ref 4–5.2)
SODIUM BLD-SCNC: 144 MMOL/L (ref 136–145)
TOTAL PROTEIN: 5.9 G/DL (ref 6.4–8.2)
TROPONIN: 0.03 NG/ML
WBC # BLD: 9.4 K/UL (ref 4–11)

## 2018-10-23 PROCEDURE — 94664 DEMO&/EVAL PT USE INHALER: CPT

## 2018-10-23 PROCEDURE — 86900 BLOOD TYPING SEROLOGIC ABO: CPT

## 2018-10-23 PROCEDURE — 94640 AIRWAY INHALATION TREATMENT: CPT

## 2018-10-23 PROCEDURE — 71250 CT THORAX DX C-: CPT

## 2018-10-23 PROCEDURE — 85730 THROMBOPLASTIN TIME PARTIAL: CPT

## 2018-10-23 PROCEDURE — 6360000002 HC RX W HCPCS: Performed by: EMERGENCY MEDICINE

## 2018-10-23 PROCEDURE — 86850 RBC ANTIBODY SCREEN: CPT

## 2018-10-23 PROCEDURE — 85025 COMPLETE CBC W/AUTO DIFF WBC: CPT

## 2018-10-23 PROCEDURE — 83880 ASSAY OF NATRIURETIC PEPTIDE: CPT

## 2018-10-23 PROCEDURE — 6370000000 HC RX 637 (ALT 250 FOR IP): Performed by: EMERGENCY MEDICINE

## 2018-10-23 PROCEDURE — 96374 THER/PROPH/DIAG INJ IV PUSH: CPT

## 2018-10-23 PROCEDURE — 70490 CT SOFT TISSUE NECK W/O DYE: CPT

## 2018-10-23 PROCEDURE — 86901 BLOOD TYPING SEROLOGIC RH(D): CPT

## 2018-10-23 PROCEDURE — 80053 COMPREHEN METABOLIC PANEL: CPT

## 2018-10-23 PROCEDURE — 84484 ASSAY OF TROPONIN QUANT: CPT

## 2018-10-23 PROCEDURE — 93005 ELECTROCARDIOGRAM TRACING: CPT | Performed by: EMERGENCY MEDICINE

## 2018-10-23 PROCEDURE — 85610 PROTHROMBIN TIME: CPT

## 2018-10-23 PROCEDURE — 99285 EMERGENCY DEPT VISIT HI MDM: CPT

## 2018-10-23 PROCEDURE — 93010 ELECTROCARDIOGRAM REPORT: CPT | Performed by: INTERNAL MEDICINE

## 2018-10-23 RX ORDER — IPRATROPIUM BROMIDE AND ALBUTEROL SULFATE 2.5; .5 MG/3ML; MG/3ML
1 SOLUTION RESPIRATORY (INHALATION) ONCE
Status: COMPLETED | OUTPATIENT
Start: 2018-10-23 | End: 2018-10-23

## 2018-10-23 RX ORDER — AMLODIPINE BESYLATE 5 MG/1
10 TABLET ORAL ONCE
Status: COMPLETED | OUTPATIENT
Start: 2018-10-23 | End: 2018-10-23

## 2018-10-23 RX ORDER — DEXAMETHASONE SODIUM PHOSPHATE 4 MG/ML
10 INJECTION, SOLUTION INTRA-ARTICULAR; INTRALESIONAL; INTRAMUSCULAR; INTRAVENOUS; SOFT TISSUE EVERY 6 HOURS
Status: DISCONTINUED | OUTPATIENT
Start: 2018-10-23 | End: 2018-10-23 | Stop reason: HOSPADM

## 2018-10-23 RX ORDER — HYDRALAZINE HYDROCHLORIDE 25 MG/1
25 TABLET, FILM COATED ORAL ONCE
Status: COMPLETED | OUTPATIENT
Start: 2018-10-23 | End: 2018-10-23

## 2018-10-23 RX ADMIN — HYDRALAZINE HYDROCHLORIDE 25 MG: 25 TABLET, FILM COATED ORAL at 08:33

## 2018-10-23 RX ADMIN — DEXAMETHASONE SODIUM PHOSPHATE 10 MG: 4 INJECTION, SOLUTION INTRAMUSCULAR; INTRAVENOUS at 05:49

## 2018-10-23 RX ADMIN — IPRATROPIUM BROMIDE AND ALBUTEROL SULFATE 1 AMPULE: .5; 3 SOLUTION RESPIRATORY (INHALATION) at 04:47

## 2018-10-23 RX ADMIN — AMLODIPINE BESYLATE 10 MG: 5 TABLET ORAL at 08:33

## 2018-10-23 NOTE — ED PROVIDER NOTES
rashes. NEUROLOGICAL: Alert and oriented. CN's 2-12 intact. No gross facial drooping. Strength 5/5, sensation intact. 2 plus DTR's in lower extremity bilaterally. PSYCHIATRIC: Normal mood and affect. LABS  I have reviewed all labs for this visit.    Results for orders placed or performed during the hospital encounter of 10/23/18   CBC Auto Differential   Result Value Ref Range    WBC 9.4 4.0 - 11.0 K/uL    RBC 3.52 (L) 4.00 - 5.20 M/uL    Hemoglobin 9.0 (L) 12.0 - 16.0 g/dL    Hematocrit 27.6 (L) 36.0 - 48.0 %    MCV 78.4 (L) 80.0 - 100.0 fL    MCH 25.5 (L) 26.0 - 34.0 pg    MCHC 32.5 31.0 - 36.0 g/dL    RDW 17.3 (H) 12.4 - 15.4 %    Platelets 573 170 - 319 K/uL    MPV 7.7 5.0 - 10.5 fL    Neutrophils % 78.8 %    Lymphocytes % 15.2 %    Monocytes % 5.3 %    Eosinophils % 0.0 %    Basophils % 0.7 %    Neutrophils # 7.4 1.7 - 7.7 K/uL    Lymphocytes # 1.4 1.0 - 5.1 K/uL    Monocytes # 0.5 0.0 - 1.3 K/uL    Eosinophils # 0.0 0.0 - 0.6 K/uL    Basophils # 0.1 0.0 - 0.2 K/uL   Comprehensive Metabolic Panel   Result Value Ref Range    Sodium 144 136 - 145 mmol/L    Potassium 3.8 3.5 - 5.1 mmol/L    Chloride 107 99 - 110 mmol/L    CO2 29 21 - 32 mmol/L    Anion Gap 8 3 - 16    Glucose 237 (H) 70 - 99 mg/dL    BUN 25 (H) 7 - 20 mg/dL    CREATININE 1.6 (H) 0.6 - 1.2 mg/dL    GFR Non- 32 (A) >60    GFR  39 (A) >60    Calcium 8.5 8.3 - 10.6 mg/dL    Total Protein 5.9 (L) 6.4 - 8.2 g/dL    Alb 2.5 (L) 3.4 - 5.0 g/dL    Albumin/Globulin Ratio 0.7 (L) 1.1 - 2.2    Total Bilirubin <0.2 0.0 - 1.0 mg/dL    Alkaline Phosphatase 64 40 - 129 U/L    ALT <5 (L) 10 - 40 U/L    AST 12 (L) 15 - 37 U/L    Globulin 3.4 g/dL   Protime-INR   Result Value Ref Range    Protime 11.8 9.8 - 13.0 sec    INR 1.04 0.86 - 1.14   APTT   Result Value Ref Range    aPTT 32.3 26.0 - 36.0 sec   Troponin   Result Value Ref Range    Troponin 0.03 (H) <0.01 ng/mL   Brain Natriuretic Peptide   Result Value Ref Range for exam:->shortness of breath Ordering Physician Provided Reason for Exam: SOB Acuity: Acute Type of Exam: Initial FINDINGS: Telemetry leads overlie the chest.  Cardiac and mediastinal contours are unchanged from previous examination with atherosclerotic calcification of the thoracic aorta. There is mild indistinctness of the central vasculature. There is a left pleural effusion with associated left basilar airspace disease. No pneumothorax. Left pleural effusion and associated basilar airspace disease, atelectasis versus pneumonia. Minimal indistinctness of the central vasculature, suggesting mild interstitial edema. Xr Chest Standard (2 Vw)    Result Date: 10/3/2018  EXAMINATION: TWO VIEWS OF THE CHEST 10/3/2018 5:07 pm COMPARISON: May 11, 2018 HISTORY: ORDERING SYSTEM PROVIDED HISTORY: sob TECHNOLOGIST PROVIDED HISTORY: Reason for exam:->sob Ordering Physician Provided Reason for Exam: sob Acuity: Acute Type of Exam: Initial FINDINGS: No evidence of pneumonia, edema or other acute pulmonary process. No evidence of acute process of the cardiac or mediastinal structures. No evidence of pneumothorax or pleural effusion. Unchanged enlargement of the heart. No evidence of acute cardiopulmonary disease. Ct Soft Tissue Neck Wo Contrast    Interval enlargement of left supraglottic mass consistent with squamous cell carcinoma, which nearly completely fills/obstructs the hypopharyngeal airway. Bilateral level 2a and left level 2b lymphadenopathy compatible with regional miguel metastasis. Absence of intravenous contrast diminishes sensitivity of the examination. Ct Soft Tissue Neck Wo Contrast    Result Date: 10/3/2018  EXAMINATION: CT OF THE NECK WITHOUT CONTRAST  10/3/2018 TECHNIQUE: CT of the neck was performed without the administration of intravenous contrast. Multiplanar reformatted images are provided for review.  Dose modulation, iterative reconstruction, and/or weight based adjustment of the of IV contrast.  The mass may be related to inflammatory process or infectious process such is abscess, but neoplastic etiology is of concern. Ct Chest Wo Contrast    Moderate centrilobular emphysema with stable chronic interstitial changes. New trace left pleural effusion. No evidence of focal consolidation suggest pneumonia. Stable bilateral adrenal adenomas. Ct Chest Wo Contrast    Result Date: 10/3/2018  EXAMINATION: CT OF THE CHEST WITHOUT CONTRAST 10/3/2018 4:05 pm TECHNIQUE: CT of the chest was performed without the administration of intravenous contrast. Multiplanar reformatted images are provided for review. Dose modulation, iterative reconstruction, and/or weight based adjustment of the mA/kV was utilized to reduce the radiation dose to as low as reasonably achievable. COMPARISON: 04/24/2018 HISTORY: ORDERING SYSTEM PROVIDED HISTORY: DYSPNEA, ON EXERTION TECHNOLOGIST PROVIDED HISTORY: Ordering Physician Provided Reason for Exam: SOB since Friday - 85% on RA Acuity: Acute Type of Exam: Initial Relevant Medical/Surgical History: smoker many years FINDINGS: Mediastinum: No mediastinal lymphadenopathy is identified. The main pulmonary artery is dilated, measuring 3.0 cm. Noncontrast imaging of the pulmonary arteries is otherwise unremarkable. Noncontrast imaging of the thoracic aorta is unremarkable for the patient's age, with evidence of mild calcinosis. There is mild mural thickening involving the lower esophagus. Lungs/pleura: Emphysema is noted. Mild dependent atelectasis found bilaterally. Lungs are otherwise clear. Secretions within the airways are detected, mainly within the trachea. Upper Abdomen: Benign adenomas are again identified within the adrenal glands, unchanged in size. Images of the upper abdomen are otherwise unremarkable. Soft Tissues/Bones: No osteolytic or osteoblastic bone lesions. No acute fractures. No definite acute abnormality detected.  No focal infiltrate is Oral, resp. rate 15, height 5' 2\" (1.575 m), weight 142 lb (64.4 kg), SpO2 100 %, not currently breastfeeding. Clare Bahena Suha was transferred in fair condition.          Gamal Nunes DO  10/23/18 [id number]

## 2018-10-24 LAB
EKG ATRIAL RATE: 98 BPM
EKG DIAGNOSIS: NORMAL
EKG P AXIS: 69 DEGREES
EKG P-R INTERVAL: 148 MS
EKG Q-T INTERVAL: 362 MS
EKG QRS DURATION: 90 MS
EKG QTC CALCULATION (BAZETT): 462 MS
EKG R AXIS: 80 DEGREES
EKG T AXIS: 70 DEGREES
EKG VENTRICULAR RATE: 98 BPM

## 2018-11-27 ENCOUNTER — TELEPHONE (OUTPATIENT)
Dept: FAMILY MEDICINE CLINIC | Age: 68
End: 2018-11-27
Payer: MEDICARE

## 2018-11-27 DIAGNOSIS — E11.42 TYPE 2 DIABETES MELLITUS WITH DIABETIC POLYNEUROPATHY, UNSPECIFIED WHETHER LONG TERM INSULIN USE (HCC): Primary | ICD-10-CM

## 2018-11-27 PROCEDURE — G0180 MD CERTIFICATION HHA PATIENT: HCPCS | Performed by: NURSE PRACTITIONER

## 2018-12-14 RX ORDER — FUROSEMIDE 40 MG/1
40 TABLET ORAL DAILY
Qty: 90 TABLET | Refills: 0 | Status: ON HOLD | OUTPATIENT
Start: 2018-12-14 | End: 2019-01-04

## 2018-12-15 ENCOUNTER — APPOINTMENT (OUTPATIENT)
Dept: GENERAL RADIOLOGY | Age: 68
End: 2018-12-15
Payer: MEDICARE

## 2018-12-15 ENCOUNTER — HOSPITAL ENCOUNTER (EMERGENCY)
Age: 68
Discharge: HOME | End: 2018-12-15
Attending: EMERGENCY MEDICINE
Payer: MEDICARE

## 2018-12-15 VITALS
HEIGHT: 62 IN | HEART RATE: 88 BPM | BODY MASS INDEX: 26.13 KG/M2 | OXYGEN SATURATION: 96 % | SYSTOLIC BLOOD PRESSURE: 143 MMHG | RESPIRATION RATE: 12 BRPM | WEIGHT: 142 LBS | DIASTOLIC BLOOD PRESSURE: 80 MMHG | TEMPERATURE: 97.1 F

## 2018-12-15 DIAGNOSIS — R09.02 HYPOXIA: ICD-10-CM

## 2018-12-15 DIAGNOSIS — J18.9 PNEUMONIA OF BOTH LOWER LOBES DUE TO INFECTIOUS ORGANISM: Primary | ICD-10-CM

## 2018-12-15 DIAGNOSIS — N39.0 COMPLICATED UTI (URINARY TRACT INFECTION): ICD-10-CM

## 2018-12-15 DIAGNOSIS — R77.8 ELEVATED TROPONIN: ICD-10-CM

## 2018-12-15 LAB
A/G RATIO: 0.5 (ref 1.1–2.2)
ALBUMIN SERPL-MCNC: 1.9 G/DL (ref 3.4–5)
ALP BLD-CCNC: 80 U/L (ref 40–129)
ALT SERPL-CCNC: 7 U/L (ref 10–40)
ANION GAP SERPL CALCULATED.3IONS-SCNC: 10 MMOL/L (ref 3–16)
ANISOCYTOSIS: ABNORMAL
AST SERPL-CCNC: 14 U/L (ref 15–37)
ATYPICAL LYMPHOCYTE RELATIVE PERCENT: 1 % (ref 0–6)
BANDED NEUTROPHILS RELATIVE PERCENT: 22 % (ref 0–7)
BASE EXCESS ARTERIAL: 9.2 MMOL/L (ref -3–3)
BASOPHILS ABSOLUTE: 0.4 K/UL (ref 0–0.2)
BASOPHILS RELATIVE PERCENT: 3 %
BILIRUB SERPL-MCNC: <0.2 MG/DL (ref 0–1)
BILIRUBIN URINE: NEGATIVE
BLOOD, URINE: ABNORMAL
BUN BLDV-MCNC: 33 MG/DL (ref 7–20)
CALCIUM SERPL-MCNC: 8.6 MG/DL (ref 8.3–10.6)
CARBOXYHEMOGLOBIN ARTERIAL: 0.2 % (ref 0–1.5)
CHLORIDE BLD-SCNC: 91 MMOL/L (ref 99–110)
CLARITY: ABNORMAL
CO2: 35 MMOL/L (ref 21–32)
COLOR: YELLOW
COMMENT UA: ABNORMAL
CREAT SERPL-MCNC: 1.9 MG/DL (ref 0.6–1.2)
EKG ATRIAL RATE: 103 BPM
EKG DIAGNOSIS: NORMAL
EKG P AXIS: 63 DEGREES
EKG P-R INTERVAL: 134 MS
EKG Q-T INTERVAL: 352 MS
EKG QRS DURATION: 88 MS
EKG QTC CALCULATION (BAZETT): 461 MS
EKG R AXIS: 86 DEGREES
EKG T AXIS: 102 DEGREES
EKG VENTRICULAR RATE: 103 BPM
EOSINOPHILS ABSOLUTE: 0 K/UL (ref 0–0.6)
EOSINOPHILS RELATIVE PERCENT: 0 %
GFR AFRICAN AMERICAN: 32
GFR NON-AFRICAN AMERICAN: 26
GLOBULIN: 3.9 G/DL
GLUCOSE BLD-MCNC: 232 MG/DL (ref 70–99)
GLUCOSE URINE: 100 MG/DL
HCO3 ARTERIAL: 36.1 MMOL/L (ref 21–29)
HCT VFR BLD CALC: 27.3 % (ref 36–48)
HEMOGLOBIN, ART, EXTENDED: 9.5 G/DL (ref 12–16)
HEMOGLOBIN: 8.9 G/DL (ref 12–16)
KETONES, URINE: ABNORMAL MG/DL
LACTIC ACID: 1 MMOL/L (ref 0.4–2)
LEUKOCYTE ESTERASE, URINE: ABNORMAL
LYMPHOCYTES ABSOLUTE: 2 K/UL (ref 1–5.1)
LYMPHOCYTES RELATIVE PERCENT: 15 %
MCH RBC QN AUTO: 28.5 PG (ref 26–34)
MCHC RBC AUTO-ENTMCNC: 32.6 G/DL (ref 31–36)
MCV RBC AUTO: 87.3 FL (ref 80–100)
METHEMOGLOBIN ARTERIAL: 0.8 %
MICROSCOPIC EXAMINATION: YES
MONOCYTES ABSOLUTE: 0.1 K/UL (ref 0–1.3)
MONOCYTES RELATIVE PERCENT: 1 %
NEUTROPHILS ABSOLUTE: 10 K/UL (ref 1.7–7.7)
NEUTROPHILS RELATIVE PERCENT: 58 %
NITRITE, URINE: NEGATIVE
NUCLEATED RED BLOOD CELLS: 1 /100 WBC
O2 CONTENT ARTERIAL: 13 ML/DL
O2 SAT, ARTERIAL: 93.5 %
O2 THERAPY: ABNORMAL
PCO2 ARTERIAL: 65 MMHG (ref 35–45)
PDW BLD-RTO: 17.8 % (ref 12.4–15.4)
PH ARTERIAL: 7.36 (ref 7.35–7.45)
PH UA: 6
PLATELET # BLD: 802 K/UL (ref 135–450)
PLATELET SLIDE REVIEW: ABNORMAL
PMV BLD AUTO: 7.3 FL (ref 5–10.5)
PO2 ARTERIAL: 77.2 MMHG (ref 75–108)
POTASSIUM SERPL-SCNC: 4.7 MMOL/L (ref 3.5–5.1)
PROTEIN UA: >=300 MG/DL
RBC # BLD: 3.12 M/UL (ref 4–5.2)
RBC UA: >100 /HPF (ref 0–2)
SLIDE REVIEW: ABNORMAL
SODIUM BLD-SCNC: 136 MMOL/L (ref 136–145)
SPECIFIC GRAVITY UA: 1.02
TCO2 ARTERIAL: 38.1 MMOL/L
TOTAL PROTEIN: 5.8 G/DL (ref 6.4–8.2)
TROPONIN: 0.13 NG/ML
TROPONIN: 0.17 NG/ML
URINE TYPE: ABNORMAL
UROBILINOGEN, URINE: 0.2 E.U./DL
WBC # BLD: 12.5 K/UL (ref 4–11)
WBC UA: >100 /HPF (ref 0–5)

## 2018-12-15 PROCEDURE — 96365 THER/PROPH/DIAG IV INF INIT: CPT

## 2018-12-15 PROCEDURE — 85025 COMPLETE CBC W/AUTO DIFF WBC: CPT

## 2018-12-15 PROCEDURE — 87040 BLOOD CULTURE FOR BACTERIA: CPT

## 2018-12-15 PROCEDURE — 93010 ELECTROCARDIOGRAM REPORT: CPT | Performed by: INTERNAL MEDICINE

## 2018-12-15 PROCEDURE — 6370000000 HC RX 637 (ALT 250 FOR IP): Performed by: EMERGENCY MEDICINE

## 2018-12-15 PROCEDURE — 84484 ASSAY OF TROPONIN QUANT: CPT

## 2018-12-15 PROCEDURE — 83605 ASSAY OF LACTIC ACID: CPT

## 2018-12-15 PROCEDURE — 80053 COMPREHEN METABOLIC PANEL: CPT

## 2018-12-15 PROCEDURE — 82803 BLOOD GASES ANY COMBINATION: CPT

## 2018-12-15 PROCEDURE — 99291 CRITICAL CARE FIRST HOUR: CPT

## 2018-12-15 PROCEDURE — 6360000002 HC RX W HCPCS: Performed by: EMERGENCY MEDICINE

## 2018-12-15 PROCEDURE — 94640 AIRWAY INHALATION TREATMENT: CPT

## 2018-12-15 PROCEDURE — 71045 X-RAY EXAM CHEST 1 VIEW: CPT

## 2018-12-15 PROCEDURE — 94761 N-INVAS EAR/PLS OXIMETRY MLT: CPT

## 2018-12-15 PROCEDURE — 87086 URINE CULTURE/COLONY COUNT: CPT

## 2018-12-15 PROCEDURE — 94664 DEMO&/EVAL PT USE INHALER: CPT

## 2018-12-15 PROCEDURE — 51702 INSERT TEMP BLADDER CATH: CPT

## 2018-12-15 PROCEDURE — 81001 URINALYSIS AUTO W/SCOPE: CPT

## 2018-12-15 PROCEDURE — 2700000000 HC OXYGEN THERAPY PER DAY

## 2018-12-15 PROCEDURE — 93005 ELECTROCARDIOGRAM TRACING: CPT | Performed by: EMERGENCY MEDICINE

## 2018-12-15 RX ORDER — LEVOFLOXACIN 25 MG/ML
750 SOLUTION ORAL DAILY
Qty: 300 ML | Refills: 0 | Status: SHIPPED | OUTPATIENT
Start: 2018-12-15 | End: 2018-12-25

## 2018-12-15 RX ORDER — IPRATROPIUM BROMIDE AND ALBUTEROL SULFATE 2.5; .5 MG/3ML; MG/3ML
1 SOLUTION RESPIRATORY (INHALATION) ONCE
Status: COMPLETED | OUTPATIENT
Start: 2018-12-15 | End: 2018-12-15

## 2018-12-15 RX ORDER — LEVOFLOXACIN 5 MG/ML
750 INJECTION, SOLUTION INTRAVENOUS ONCE
Status: COMPLETED | OUTPATIENT
Start: 2018-12-15 | End: 2018-12-15

## 2018-12-15 RX ADMIN — LEVOFLOXACIN 750 MG: 5 INJECTION, SOLUTION INTRAVENOUS at 15:48

## 2018-12-15 RX ADMIN — IPRATROPIUM BROMIDE AND ALBUTEROL SULFATE 1 AMPULE: .5; 3 SOLUTION RESPIRATORY (INHALATION) at 13:51

## 2018-12-15 ASSESSMENT — PULMONARY FUNCTION TESTS: PEFR_L/MIN: 16

## 2018-12-15 NOTE — ED NOTES
Repeat troponin drawn from PIV and sent to lab along with urine sample.      Grace Bourne RN  12/15/18 5027

## 2018-12-15 NOTE — ED PROVIDER NOTES
Emergency Department Provider Note  Location: [address] [place] [address]  ED  12/15/2018     Patient Identification  Edward Moore is a 80 y.o. female    Chief Complaint  Shortness of Breath (Per life squad, SPO2 in the 70's. Pt has a trach. Family did not feel comfortable suctioning her. Pt suctioned by life squad. Family did not want the life squad to switch out the trach. Pt on 10 L at home.  )      Mode of Arrival  EMS    HPI  (History provided by EMS and family)  This is a 80 y.o. female with a PMH significant for CHF, HTN, DM, throat cancer with tracheostomy and currently on home hospice presented today for secretions from trach and hypoxia. EMS reported O2 sat of 70's and lots of secretions noted. Daughter later reported that she has noticed some cough. No fever. History otherwise limited because patient is nonverbal.  Caregiver did say patient received a dose of morphine prior to coming to our ED which is why patient is currently sleepier than usual but this is typical for her whenever she received morphine. ROS  Review of Systems   Unable to perform ROS: Patient nonverbal        I have reviewed the following nursing documentation:  Allergies: Allergies   Allergen Reactions    Celecoxib Swelling    Codeine Nausea Only    Zoloft [Sertraline Hcl] Diarrhea       Past medical history:  has a past medical history of Acute on chronic congestive heart failure ([address] Arthritis; Carotid stenosis; Chronic back pain; DDD (degenerative disc disease) (lumbar); Diabetes mellitus ([address] Hyperlipidemia; Hypertension; Neuropathy; Pneumonia; Stroke ([address] (7/1/2013); Throat cancer ([address] and Thyroid disease. Past surgical history:  has a past surgical history that includes back surgery; eye surgery (Right, 2/22/2013); Colonoscopy (01/09/2018); pr esophagogastroduodenoscopy transoral diagnostic (N/A, 10/16/2018); and pr egd percutaneous placement gastrostomy tube (N/A, 10/17/2018).     Home medications: GFR  32 (A) >60    Calcium 8.6 8.3 - 10.6 mg/dL    Total Protein 5.8 (L) 6.4 - 8.2 g/dL    Alb 1.9 (L) 3.4 - 5.0 g/dL    Albumin/Globulin Ratio 0.5 (L) 1.1 - 2.2    Total Bilirubin <0.2 0.0 - 1.0 mg/dL    Alkaline Phosphatase 80 40 - 129 U/L    ALT 7 (L) 10 - 40 U/L    AST 14 (L) 15 - 37 U/L    Globulin 3.9 g/dL   Lactic Acid, Plasma   Result Value Ref Range    Lactic Acid 1.0 0.4 - 2.0 mmol/L   Troponin   Result Value Ref Range    Troponin 0.17 (H) <0.01 ng/mL   EKG 12 Lead   Result Value Ref Range    Ventricular Rate 103 BPM    Atrial Rate 103 BPM    P-R Interval 134 ms    QRS Duration 88 ms    Q-T Interval 352 ms    QTc Calculation (Bazett) 461 ms    P Axis 63 degrees    R Axis 86 degrees    T Axis 102 degrees    Diagnosis       Sinus tachycardia with  pvcSeptal infarct , age undetermined  possiblyNonspecific ST elevationAbnormal ECGWhen compared with ECG of 23-OCT-2018 03:08,Nonspecific T wave abnormality now evident in Inferior leadsNonspecific T wave abnormality now evident in Lateral leadsConfirmed by Elizabet Ureña MD, Kashmir Cee ([id number]) on 12/15/2018 3:29:50 PM       - Patient seen and evaluated in room 1.  80 y.o. female presented for SOB, hypoxia, increased secretions from her trach. Based on history and exam findings, initial DDx included but not limited to ACS, COPD/asthma exacerbation, CHF exacerbation, acute respiratory failure, pericardial effusion or tamponade, PNA, PTX, PE, sepsis,   - Old records reviewed. - Patient was given duoneb and we increased O2 in the ED. Upon reassessment, patient's O2 sat improved and she was also more awake alert. She was observed smiling at her daughter and appeared much better. - Diagnostic studies reviewed and results discussed with daughter. We specifically talked about the elevated troponin and EKG changes. It is likely the patient had a cardiac event in the setting of hypoxia.   However given the patient is hospice, daughter is not interested in be inappropriate. If there are any questions or concerns please feel free to contact the dictating provider for clarification.      Lg Rust MD  [id number] Sarah Johnson MD  12/15/18 2008

## 2018-12-16 NOTE — ED NOTES
Dr. Stephanie Graves verbal order to change beckett catheter. Beckett care used while changing catheter. 16 fr placed with no issues.       Gerardo Webber, RN  12/15/18 [id number]

## 2018-12-16 NOTE — ED NOTES
Dr. Harpreet Berry spoke with family. States family feels okay to take care of patient at home.       Julio Cesar Mayes RN  12/15/18 [id number]

## 2018-12-17 LAB
ORGANISM: ABNORMAL
URINE CULTURE, ROUTINE: ABNORMAL
URINE CULTURE, ROUTINE: ABNORMAL

## 2018-12-20 LAB
BLOOD CULTURE, ROUTINE: NORMAL
CULTURE, BLOOD 2: NORMAL

## 2019-01-03 ENCOUNTER — APPOINTMENT (OUTPATIENT)
Dept: GENERAL RADIOLOGY | Age: 69
DRG: 872 | End: 2019-01-03
Payer: COMMERCIAL

## 2019-01-03 ENCOUNTER — HOSPITAL ENCOUNTER (INPATIENT)
Age: 69
LOS: 1 days | Discharge: HOSPICE | DRG: 872 | End: 2019-01-04
Attending: EMERGENCY MEDICINE | Admitting: INTERNAL MEDICINE
Payer: COMMERCIAL

## 2019-01-03 DIAGNOSIS — I50.9 ACUTE CONGESTIVE HEART FAILURE, UNSPECIFIED HEART FAILURE TYPE (HCC): ICD-10-CM

## 2019-01-03 DIAGNOSIS — E03.8 OTHER SPECIFIED HYPOTHYROIDISM: ICD-10-CM

## 2019-01-03 DIAGNOSIS — E87.1 HYPONATREMIA: ICD-10-CM

## 2019-01-03 DIAGNOSIS — N39.0 SEPSIS DUE TO URINARY TRACT INFECTION (HCC): ICD-10-CM

## 2019-01-03 DIAGNOSIS — N39.0 ACUTE UTI: ICD-10-CM

## 2019-01-03 DIAGNOSIS — R06.02 SHORTNESS OF BREATH: Primary | ICD-10-CM

## 2019-01-03 DIAGNOSIS — A41.9 SEPSIS DUE TO URINARY TRACT INFECTION (HCC): ICD-10-CM

## 2019-01-03 LAB
BASE EXCESS VENOUS: 7 MMOL/L (ref -3–3)
BASOPHILS ABSOLUTE: 0 K/UL (ref 0–0.2)
BASOPHILS RELATIVE PERCENT: 0.3 %
CARBOXYHEMOGLOBIN: 1.7 % (ref 0–1.5)
EOSINOPHILS ABSOLUTE: 0 K/UL (ref 0–0.6)
EOSINOPHILS RELATIVE PERCENT: 0 %
HCO3 VENOUS: 34.5 MMOL/L (ref 23–29)
HCT VFR BLD CALC: 31 % (ref 36–48)
HEMOGLOBIN: 10.7 G/DL (ref 12–16)
LYMPHOCYTES ABSOLUTE: 0.5 K/UL (ref 1–5.1)
LYMPHOCYTES RELATIVE PERCENT: 2.6 %
MCH RBC QN AUTO: 28 PG (ref 26–34)
MCHC RBC AUTO-ENTMCNC: 34.6 G/DL (ref 31–36)
MCV RBC AUTO: 80.9 FL (ref 80–100)
METHEMOGLOBIN VENOUS: 0.6 %
MONOCYTES ABSOLUTE: 0.6 K/UL (ref 0–1.3)
MONOCYTES RELATIVE PERCENT: 3.4 %
NEUTROPHILS ABSOLUTE: 16.9 K/UL (ref 1.7–7.7)
NEUTROPHILS RELATIVE PERCENT: 93.7 %
O2 CONTENT, VEN: 14 VOL %
O2 SAT, VEN: 87 %
O2 THERAPY: ABNORMAL
PCO2, VEN: 63.9 MMHG (ref 40–50)
PDW BLD-RTO: 17.4 % (ref 12.4–15.4)
PH VENOUS: 7.35 (ref 7.35–7.45)
PLATELET # BLD: 459 K/UL (ref 135–450)
PMV BLD AUTO: 6.6 FL (ref 5–10.5)
PO2, VEN: 53.7 MMHG (ref 25–40)
RBC # BLD: 3.83 M/UL (ref 4–5.2)
TCO2 CALC VENOUS: 36 MMOL/L
WBC # BLD: 18.1 K/UL (ref 4–11)

## 2019-01-03 PROCEDURE — 83735 ASSAY OF MAGNESIUM: CPT

## 2019-01-03 PROCEDURE — 87077 CULTURE AEROBIC IDENTIFY: CPT

## 2019-01-03 PROCEDURE — 85025 COMPLETE CBC W/AUTO DIFF WBC: CPT

## 2019-01-03 PROCEDURE — 84145 PROCALCITONIN (PCT): CPT

## 2019-01-03 PROCEDURE — 87086 URINE CULTURE/COLONY COUNT: CPT

## 2019-01-03 PROCEDURE — 83605 ASSAY OF LACTIC ACID: CPT

## 2019-01-03 PROCEDURE — 82803 BLOOD GASES ANY COMBINATION: CPT

## 2019-01-03 PROCEDURE — 71045 X-RAY EXAM CHEST 1 VIEW: CPT

## 2019-01-03 PROCEDURE — 80053 COMPREHEN METABOLIC PANEL: CPT

## 2019-01-03 PROCEDURE — 99285 EMERGENCY DEPT VISIT HI MDM: CPT

## 2019-01-03 PROCEDURE — 2700000000 HC OXYGEN THERAPY PER DAY

## 2019-01-03 PROCEDURE — 94761 N-INVAS EAR/PLS OXIMETRY MLT: CPT

## 2019-01-03 PROCEDURE — 81001 URINALYSIS AUTO W/SCOPE: CPT

## 2019-01-03 PROCEDURE — 87186 SC STD MICRODIL/AGAR DIL: CPT

## 2019-01-04 VITALS
WEIGHT: 169.75 LBS | OXYGEN SATURATION: 99 % | RESPIRATION RATE: 14 BRPM | BODY MASS INDEX: 31.24 KG/M2 | HEIGHT: 62 IN | SYSTOLIC BLOOD PRESSURE: 164 MMHG | TEMPERATURE: 97.8 F | DIASTOLIC BLOOD PRESSURE: 86 MMHG | HEART RATE: 3 BPM

## 2019-01-04 PROBLEM — A41.9 SEPSIS (HCC): Status: ACTIVE | Noted: 2019-01-04

## 2019-01-04 PROBLEM — N39.0 UTI (URINARY TRACT INFECTION): Status: ACTIVE | Noted: 2019-01-04

## 2019-01-04 LAB
A/G RATIO: 0.6 (ref 1.1–2.2)
ALBUMIN SERPL-MCNC: 2.3 G/DL (ref 3.4–5)
ALP BLD-CCNC: 78 U/L (ref 40–129)
ALT SERPL-CCNC: 7 U/L (ref 10–40)
ANION GAP SERPL CALCULATED.3IONS-SCNC: 11 MMOL/L (ref 3–16)
ANION GAP SERPL CALCULATED.3IONS-SCNC: 13 MMOL/L (ref 3–16)
AST SERPL-CCNC: 18 U/L (ref 15–37)
BACTERIA: ABNORMAL /HPF
BASOPHILS ABSOLUTE: 0 K/UL (ref 0–0.2)
BASOPHILS RELATIVE PERCENT: 0.3 %
BILIRUB SERPL-MCNC: 0.3 MG/DL (ref 0–1)
BILIRUBIN URINE: NEGATIVE
BLOOD, URINE: ABNORMAL
BUN BLDV-MCNC: 26 MG/DL (ref 7–20)
BUN BLDV-MCNC: 27 MG/DL (ref 7–20)
CALCIUM SERPL-MCNC: 8.6 MG/DL (ref 8.3–10.6)
CALCIUM SERPL-MCNC: 9.4 MG/DL (ref 8.3–10.6)
CHLORIDE BLD-SCNC: 66 MMOL/L (ref 99–110)
CHLORIDE BLD-SCNC: 70 MMOL/L (ref 99–110)
CLARITY: ABNORMAL
CO2: 31 MMOL/L (ref 21–32)
CO2: 31 MMOL/L (ref 21–32)
COLOR: YELLOW
CREAT SERPL-MCNC: 1.2 MG/DL (ref 0.6–1.2)
CREAT SERPL-MCNC: 1.4 MG/DL (ref 0.6–1.2)
EOSINOPHILS ABSOLUTE: 0 K/UL (ref 0–0.6)
EOSINOPHILS RELATIVE PERCENT: 0 %
EPITHELIAL CELLS, UA: ABNORMAL /HPF
GFR AFRICAN AMERICAN: 45
GFR AFRICAN AMERICAN: 54
GFR NON-AFRICAN AMERICAN: 37
GFR NON-AFRICAN AMERICAN: 45
GLOBULIN: 4 G/DL
GLUCOSE BLD-MCNC: 190 MG/DL (ref 70–99)
GLUCOSE BLD-MCNC: 218 MG/DL (ref 70–99)
GLUCOSE URINE: 100 MG/DL
HCT VFR BLD CALC: 25.9 % (ref 36–48)
HEMOGLOBIN: 8.7 G/DL (ref 12–16)
KETONES, URINE: NEGATIVE MG/DL
L. PNEUMOPHILA SEROGP 1 UR AG: NORMAL
LACTIC ACID: 1.8 MMOL/L (ref 0.4–2)
LEUKOCYTE ESTERASE, URINE: ABNORMAL
LYMPHOCYTES ABSOLUTE: 0.4 K/UL (ref 1–5.1)
LYMPHOCYTES RELATIVE PERCENT: 3.2 %
MAGNESIUM: 1.6 MG/DL (ref 1.8–2.4)
MAGNESIUM: 1.9 MG/DL (ref 1.8–2.4)
MCH RBC QN AUTO: 27.2 PG (ref 26–34)
MCHC RBC AUTO-ENTMCNC: 33.5 G/DL (ref 31–36)
MCV RBC AUTO: 81.2 FL (ref 80–100)
MICROSCOPIC EXAMINATION: YES
MONOCYTES ABSOLUTE: 0.4 K/UL (ref 0–1.3)
MONOCYTES RELATIVE PERCENT: 3.2 %
MUCUS: ABNORMAL /LPF
NEUTROPHILS ABSOLUTE: 12.3 K/UL (ref 1.7–7.7)
NEUTROPHILS RELATIVE PERCENT: 93.3 %
NITRITE, URINE: NEGATIVE
PDW BLD-RTO: 17.1 % (ref 12.4–15.4)
PH UA: 7
PLATELET # BLD: 291 K/UL (ref 135–450)
PMV BLD AUTO: 6.6 FL (ref 5–10.5)
POTASSIUM REFLEX MAGNESIUM: 3.1 MMOL/L (ref 3.5–5.1)
POTASSIUM REFLEX MAGNESIUM: 3.1 MMOL/L (ref 3.5–5.1)
PROCALCITONIN: 0.12 NG/ML (ref 0–0.15)
PROTEIN UA: 100 MG/DL
RBC # BLD: 3.19 M/UL (ref 4–5.2)
RBC UA: ABNORMAL /HPF (ref 0–2)
SODIUM BLD-SCNC: 110 MMOL/L (ref 136–145)
SODIUM BLD-SCNC: 112 MMOL/L (ref 136–145)
SPECIFIC GRAVITY UA: 1.01
STREP PNEUMONIAE ANTIGEN, URINE: NORMAL
TOTAL PROTEIN: 6.3 G/DL (ref 6.4–8.2)
URINE REFLEX TO CULTURE: YES
URINE TYPE: ABNORMAL
UROBILINOGEN, URINE: 0.2 E.U./DL
WBC # BLD: 13.2 K/UL (ref 4–11)
WBC UA: >100 /HPF (ref 0–5)
YEAST: PRESENT /HPF

## 2019-01-04 PROCEDURE — 6360000002 HC RX W HCPCS: Performed by: INTERNAL MEDICINE

## 2019-01-04 PROCEDURE — 85025 COMPLETE CBC W/AUTO DIFF WBC: CPT

## 2019-01-04 PROCEDURE — 2580000003 HC RX 258: Performed by: EMERGENCY MEDICINE

## 2019-01-04 PROCEDURE — 6360000002 HC RX W HCPCS: Performed by: EMERGENCY MEDICINE

## 2019-01-04 PROCEDURE — 94761 N-INVAS EAR/PLS OXIMETRY MLT: CPT

## 2019-01-04 PROCEDURE — 96365 THER/PROPH/DIAG IV INF INIT: CPT

## 2019-01-04 PROCEDURE — 2060000000 HC ICU INTERMEDIATE R&B

## 2019-01-04 PROCEDURE — 80048 BASIC METABOLIC PNL TOTAL CA: CPT

## 2019-01-04 PROCEDURE — 2580000003 HC RX 258: Performed by: INTERNAL MEDICINE

## 2019-01-04 PROCEDURE — 83735 ASSAY OF MAGNESIUM: CPT

## 2019-01-04 PROCEDURE — 87449 NOS EACH ORGANISM AG IA: CPT

## 2019-01-04 PROCEDURE — 36415 COLL VENOUS BLD VENIPUNCTURE: CPT

## 2019-01-04 PROCEDURE — 96375 TX/PRO/DX INJ NEW DRUG ADDON: CPT

## 2019-01-04 PROCEDURE — 2700000000 HC OXYGEN THERAPY PER DAY

## 2019-01-04 PROCEDURE — 6370000000 HC RX 637 (ALT 250 FOR IP): Performed by: INTERNAL MEDICINE

## 2019-01-04 RX ORDER — ONDANSETRON 2 MG/ML
4 INJECTION INTRAMUSCULAR; INTRAVENOUS EVERY 6 HOURS PRN
Status: DISCONTINUED | OUTPATIENT
Start: 2019-01-04 | End: 2019-01-04 | Stop reason: HOSPADM

## 2019-01-04 RX ORDER — SODIUM CHLORIDE 0.9 % (FLUSH) 0.9 %
10 SYRINGE (ML) INJECTION EVERY 12 HOURS SCHEDULED
Status: DISCONTINUED | OUTPATIENT
Start: 2019-01-04 | End: 2019-01-04 | Stop reason: HOSPADM

## 2019-01-04 RX ORDER — PAROXETINE HYDROCHLORIDE 20 MG/1
20 TABLET, FILM COATED ORAL DAILY
Status: DISCONTINUED | OUTPATIENT
Start: 2019-01-04 | End: 2019-01-04 | Stop reason: HOSPADM

## 2019-01-04 RX ORDER — FUROSEMIDE 20 MG/1
20 TABLET ORAL DAILY
Qty: 30 TABLET | Refills: 0 | Status: SHIPPED | OUTPATIENT
Start: 2019-01-04

## 2019-01-04 RX ORDER — IPRATROPIUM BROMIDE AND ALBUTEROL SULFATE 2.5; .5 MG/3ML; MG/3ML
3 SOLUTION RESPIRATORY (INHALATION) EVERY 4 HOURS PRN
Status: DISCONTINUED | OUTPATIENT
Start: 2019-01-04 | End: 2019-01-04 | Stop reason: HOSPADM

## 2019-01-04 RX ORDER — MAGNESIUM SULFATE 1 G/100ML
1 INJECTION INTRAVENOUS PRN
Status: DISCONTINUED | OUTPATIENT
Start: 2019-01-04 | End: 2019-01-04 | Stop reason: HOSPADM

## 2019-01-04 RX ORDER — FUROSEMIDE 40 MG/1
20 TABLET ORAL DAILY
Qty: 90 TABLET | Refills: 0 | Status: SHIPPED | OUTPATIENT
Start: 2019-01-04 | End: 2019-01-04

## 2019-01-04 RX ORDER — MAGNESIUM SULFATE IN WATER 40 MG/ML
1 INJECTION, SOLUTION INTRAVENOUS ONCE
Status: COMPLETED | OUTPATIENT
Start: 2019-01-04 | End: 2019-01-04

## 2019-01-04 RX ORDER — POTASSIUM CHLORIDE 7.45 MG/ML
10 INJECTION INTRAVENOUS PRN
Status: DISCONTINUED | OUTPATIENT
Start: 2019-01-04 | End: 2019-01-04 | Stop reason: HOSPADM

## 2019-01-04 RX ORDER — ALBUTEROL SULFATE 90 UG/1
2 AEROSOL, METERED RESPIRATORY (INHALATION) EVERY 4 HOURS PRN
Status: DISCONTINUED | OUTPATIENT
Start: 2019-01-04 | End: 2019-01-04 | Stop reason: HOSPADM

## 2019-01-04 RX ORDER — ATORVASTATIN CALCIUM 10 MG/1
10 TABLET, FILM COATED ORAL DAILY
Status: DISCONTINUED | OUTPATIENT
Start: 2019-01-04 | End: 2019-01-04

## 2019-01-04 RX ORDER — SODIUM CHLORIDE 9 MG/ML
1000 INJECTION, SOLUTION INTRAVENOUS CONTINUOUS
Status: DISCONTINUED | OUTPATIENT
Start: 2019-01-04 | End: 2019-01-04 | Stop reason: HOSPADM

## 2019-01-04 RX ORDER — POTASSIUM CHLORIDE 20 MEQ/1
40 TABLET, EXTENDED RELEASE ORAL PRN
Status: DISCONTINUED | OUTPATIENT
Start: 2019-01-04 | End: 2019-01-04 | Stop reason: HOSPADM

## 2019-01-04 RX ORDER — BUSPIRONE HYDROCHLORIDE 5 MG/1
7.5 TABLET ORAL 3 TIMES DAILY
Status: DISCONTINUED | OUTPATIENT
Start: 2019-01-04 | End: 2019-01-04

## 2019-01-04 RX ORDER — LEVOTHYROXINE SODIUM 0.2 MG/1
200 TABLET ORAL DAILY
Qty: 30 TABLET | Refills: 0 | Status: SHIPPED | OUTPATIENT
Start: 2019-01-04

## 2019-01-04 RX ORDER — LEVOTHYROXINE SODIUM 0.2 MG/1
175 TABLET ORAL DAILY
Qty: 30 TABLET | Refills: 0
Start: 2019-01-04 | End: 2019-01-04

## 2019-01-04 RX ORDER — SODIUM CHLORIDE 0.9 % (FLUSH) 0.9 %
10 SYRINGE (ML) INJECTION PRN
Status: DISCONTINUED | OUTPATIENT
Start: 2019-01-04 | End: 2019-01-04 | Stop reason: HOSPADM

## 2019-01-04 RX ORDER — FUROSEMIDE 40 MG/1
60 TABLET ORAL 2 TIMES DAILY
Qty: 90 TABLET | Refills: 0 | Status: SHIPPED | OUTPATIENT
Start: 2019-01-04 | End: 2019-01-04

## 2019-01-04 RX ORDER — GABAPENTIN 100 MG/1
100 CAPSULE ORAL NIGHTLY
Status: DISCONTINUED | OUTPATIENT
Start: 2019-01-05 | End: 2019-01-04

## 2019-01-04 RX ORDER — POTASSIUM CHLORIDE 20MEQ/15ML
40 LIQUID (ML) ORAL PRN
Status: DISCONTINUED | OUTPATIENT
Start: 2019-01-04 | End: 2019-01-04 | Stop reason: HOSPADM

## 2019-01-04 RX ORDER — CARVEDILOL 6.25 MG/1
6.25 TABLET ORAL 2 TIMES DAILY
Status: DISCONTINUED | OUTPATIENT
Start: 2019-01-04 | End: 2019-01-04

## 2019-01-04 RX ORDER — ACETAMINOPHEN 325 MG/1
650 TABLET ORAL EVERY 4 HOURS PRN
Status: DISCONTINUED | OUTPATIENT
Start: 2019-01-04 | End: 2019-01-04 | Stop reason: HOSPADM

## 2019-01-04 RX ORDER — TRAZODONE HYDROCHLORIDE 50 MG/1
50 TABLET ORAL NIGHTLY
Status: DISCONTINUED | OUTPATIENT
Start: 2019-01-05 | End: 2019-01-04

## 2019-01-04 RX ORDER — HYDRALAZINE HYDROCHLORIDE 25 MG/1
25 TABLET, FILM COATED ORAL EVERY 8 HOURS SCHEDULED
Status: DISCONTINUED | OUTPATIENT
Start: 2019-01-04 | End: 2019-01-04

## 2019-01-04 RX ORDER — AMLODIPINE BESYLATE 5 MG/1
10 TABLET ORAL DAILY
Status: DISCONTINUED | OUTPATIENT
Start: 2019-01-04 | End: 2019-01-04

## 2019-01-04 RX ADMIN — PIPERACILLIN AND TAZOBACTAM 3.38 G: 3; .375 INJECTION, POWDER, FOR SOLUTION INTRAVENOUS at 12:24

## 2019-01-04 RX ADMIN — PIPERACILLIN AND TAZOBACTAM 3.38 G: 3; .375 INJECTION, POWDER, FOR SOLUTION INTRAVENOUS at 16:09

## 2019-01-04 RX ADMIN — SODIUM CHLORIDE 1000 ML: 9 INJECTION, SOLUTION INTRAVENOUS at 04:47

## 2019-01-04 RX ADMIN — VANCOMYCIN HYDROCHLORIDE 1250 MG: 1 INJECTION, POWDER, LYOPHILIZED, FOR SOLUTION INTRAVENOUS at 03:05

## 2019-01-04 RX ADMIN — SODIUM CHLORIDE 1000 ML: 9 INJECTION, SOLUTION INTRAVENOUS at 01:40

## 2019-01-04 RX ADMIN — MAGNESIUM SULFATE IN WATER 1 G: 40 INJECTION, SOLUTION INTRAVENOUS at 01:40

## 2019-01-04 RX ADMIN — ENOXAPARIN SODIUM 40 MG: 40 INJECTION SUBCUTANEOUS at 12:25

## 2019-01-04 RX ADMIN — PIPERACILLIN SODIUM,TAZOBACTAM SODIUM 3.38 G: 3; .375 INJECTION, POWDER, FOR SOLUTION INTRAVENOUS at 01:48

## 2019-01-05 LAB
ORGANISM: ABNORMAL
URINE CULTURE, ROUTINE: ABNORMAL
URINE CULTURE, ROUTINE: ABNORMAL

## 2019-02-03 PROBLEM — N39.0 UTI (URINARY TRACT INFECTION): Status: RESOLVED | Noted: 2019-01-04 | Resolved: 2019-02-03

## 2023-04-24 NOTE — PROGRESS NOTES
04/24/23 0706   Pain Assessment   Pain Assessment Tool 0-10   Pain Score 5   Pain Location/Orientation Orientation: Lower; Location: Back   Restrictions/Precautions   Precautions Bed/chair alarms; Fall Risk;Pain;Spinal precautions;Cognitive   Weight Bearing Restrictions No   ROM Restrictions   (spinal prec)   Braces or Orthoses LSO   Oral Hygiene   Type of Assistance Needed Set-up / clean-up   Oral Hygiene CARE Score 5   Grooming   Able To Initiate Tasks;Comb/Brush Hair;Wash/Dry Face;Brush/Clean Teeth;Wash/Dry Hands   Limitation Noted In Safety;Strength   Findings s/u w/c at the sink   Shower/Bathe Self   Type of Assistance Needed Supervision;Verbal cues   Shower/Bathe Self CARE Score 4   Bathing   Assessed Bath Style Sponge Bath   Anticipated D/C Bath Style Sponge Bath   Able to Gather/Transport No   Able to [employer] Temperature No   Able to Wash/Rinse/Dry (body part) Left Arm;Right Arm;L Upper Leg;R Upper Leg;L Lower Leg/Foot;R Lower Leg/Foot;Chest;Abdomen;Perineal Area; Buttocks   Limitations Noted in Balance; Endurance;Problem Solving;ROM;Safety;Strength   Positioning Seated;Standing   Adaptive Equipment Longhand Sponge;Longhand Reacher   Tub/Shower Transfer   Reason Not Assessed Sponge Bath   Upper Body Dressing   Type of Assistance Needed Physical assistance   Physical Assistance Level 25% or less   Comment s/u bra and shirt and min A LSO   Upper Body Dressing CARE Score 3   Lower Body Dressing   Type of Assistance Needed Supervision;Verbal cues   Comment cues for use of reacher to defer bending at waist   Lower Body Dressing CARE Score 4   Putting On/Taking Off Footwear   Type of Assistance Needed Supervision   Putting On/Taking Off Footwear CARE Score 4   Picking Up Object   Type of Assistance Needed Supervision;Verbal cues   Comment cues for use of reacher   Picking Up Object CARE Score 4   Dressing/Undressing Clothing   Remove UB Clothes Pullover Shirt   Don UB [address] [facility] Pt a/o. Pt stated pain in ears and neck--updated on when pt can get pain medication next per [facility] Pt stated neuropathy in feet. Pt stated no SOB. Pt remains NPO and understands--stated she is hungry, given mouth swabs prn. Bed locked in lowest position; side rails 2/4; call light within reach; will continue to monitor. Pants;Undergarment; Shoes   Don [employer]; Undergarment;Socks; Shoes   Limitations Noted In Balance; Endurance;Problem Solving; Safety;Strength;ROM   Adaptive Equipment Reacher;Sock Aide   Positioning Supported Sit;Standing   Lying to Sitting on Side of Bed   Type of Assistance Needed Supervision   Lying to Sitting on Side of Bed CARE Score 4   Sit to Stand   Type of Assistance Needed Supervision; Incidental touching   Comment CGA for initial stand OOB   Sit to Stand CARE Score 4   Bed-Chair Transfer   Type of Assistance Needed Supervision   Chair/Bed-to-Chair Transfer CARE Score 4   Toileting Hygiene   Type of Assistance Needed Supervision   Toileting Hygiene CARE Score 4   Toileting   Able to [address] down yes, up yes  Manage Hygiene Bladder   Limitations Noted In Balance;Problem Solving;ROM;Safety;LE Strength   Adaptive Equipment Grab Bar   Toilet Transfer   Type of Assistance Needed Supervision   Toilet Transfer CARE Score 4   Toilet Transfer   Surface Assessed Raised Toilet   Transfer Technique Standard   Limitations Noted In Balance; Endurance;Problem Solving;ROM;Safety;LE Strength   Adaptive Equipment Grab Bar;Walker   Additional Activities   Additional Activities Other (Comment)   Additional Activities Comments fxl mobility to and from BR with S/ CGA and LSO   Other Comments   Assessment Pt participates in 28 minutes concurrent treatment focusing on ADL tasks and related transfers/ mobility to increase strength and activity tolerance with similar goals as another patient   Assessment   Treatment Assessment Pt presents supine agreeable to OT session inclduign toileting, ADLs, transfers/ mobility and meal completion with pt encouraged to sit OOB for meal before resting supine  Pt requires assist due to decreased balance, safety, endurance, strength/ ROM with spinal prec and LSO   Pt is progressing nciely towards goals and will benefit from continued skilled OT services to increase independence with daily tasks    Problem List Decreased strength;Decreased range of motion;Decreased endurance; Impaired balance;Decreased safety awareness;Pain;Orthopedic restrictions   Plan   Treatment/Interventions ADL retraining;Functional transfer training; Therapeutic exercise; Endurance training;Patient/family training;Equipment eval/education;Cognitive reorientation; Compensatory technique education   Progress Progressing toward goals   OT Therapy Minutes   OT Time In 0706   OT Time Out 0820   OT Total Time (minutes) 74   OT Mode of treatment - Individual (minutes) 46   OT Mode of treatment - Concurrent (minutes) 28   Therapy Time missed   Time missed?  No

## (undated) DEVICE — Z DISCONTINUED USE 2276105 GOWN PROTCT UNIV CHST W28IN L49IN SL 24IN BLU SPUNBOND FLM

## (undated) DEVICE — PEG STANDARD SYSTEM: Brand: ENTAKE

## (undated) DEVICE — CONMED SCOPE SAVER BITE BLOCK, 20X27 MM: Brand: SCOPE SAVER

## (undated) DEVICE — ELECTRODE ECG MONITR FOAM TEAR DROP ADLT RED